# Patient Record
Sex: FEMALE | Race: WHITE | NOT HISPANIC OR LATINO | Employment: OTHER | URBAN - METROPOLITAN AREA
[De-identification: names, ages, dates, MRNs, and addresses within clinical notes are randomized per-mention and may not be internally consistent; named-entity substitution may affect disease eponyms.]

---

## 2017-01-16 ENCOUNTER — HISTORICAL (OUTPATIENT)
Dept: RESPIRATORY THERAPY | Facility: HOSPITAL | Age: 62
End: 2017-01-16

## 2017-02-06 ENCOUNTER — HISTORICAL (OUTPATIENT)
Dept: LAB | Facility: HOSPITAL | Age: 62
End: 2017-02-06

## 2017-03-14 ENCOUNTER — HISTORICAL (OUTPATIENT)
Dept: RADIOLOGY | Facility: HOSPITAL | Age: 62
End: 2017-03-14

## 2017-03-14 LAB
BUN SERPL-MCNC: 13 MG/DL (ref 7–18)
CREAT SERPL-MCNC: 0.63 MG/DL (ref 0.6–1.3)

## 2017-03-15 ENCOUNTER — HISTORICAL (OUTPATIENT)
Dept: ADMINISTRATIVE | Facility: HOSPITAL | Age: 62
End: 2017-03-15

## 2017-04-18 ENCOUNTER — HISTORICAL (OUTPATIENT)
Dept: RADIOLOGY | Facility: HOSPITAL | Age: 62
End: 2017-04-18

## 2017-05-23 ENCOUNTER — HISTORICAL (OUTPATIENT)
Dept: INTERNAL MEDICINE | Facility: CLINIC | Age: 62
End: 2017-05-23

## 2017-05-23 LAB
ABS NEUT (OLG): 2.57 X10(3)/MCL (ref 2.1–9.2)
ALBUMIN SERPL-MCNC: 4 GM/DL (ref 3.4–5)
ALBUMIN/GLOB SERPL: 1 {RATIO}
ALP SERPL-CCNC: 70 UNIT/L (ref 20–120)
ALT SERPL-CCNC: 19 UNIT/L
AST SERPL-CCNC: 23 UNIT/L
BASOPHILS # BLD AUTO: 0.04 X10(3)/MCL
BASOPHILS NFR BLD AUTO: 1 % (ref 0–1)
BILIRUB SERPL-MCNC: 0.8 MG/DL
BILIRUBIN DIRECT+TOT PNL SERPL-MCNC: <0.1 MG/DL
BILIRUBIN DIRECT+TOT PNL SERPL-MCNC: >0.7 MG/DL
BUN SERPL-MCNC: 14 MG/DL (ref 7–25)
CALCIUM SERPL-MCNC: 9.2 MG/DL (ref 8.4–10.3)
CHLORIDE SERPL-SCNC: 104 MMOL/L (ref 96–110)
CO2 SERPL-SCNC: 29 MMOL/L (ref 24–32)
CREAT SERPL-MCNC: 0.67 MG/DL (ref 0.7–1.1)
CRP SERPL-MCNC: <0.5 MG/DL
EOSINOPHIL # BLD AUTO: 0.17 10*3/UL
EOSINOPHIL NFR BLD AUTO: 3 % (ref 0–5)
ERYTHROCYTE [DISTWIDTH] IN BLOOD BY AUTOMATED COUNT: 12.8 % (ref 11.5–14.5)
ERYTHROCYTE [SEDIMENTATION RATE] IN BLOOD: 9 MM/HR (ref 0–20)
GLOBULIN SER-MCNC: 3.2 GM/ML (ref 2.3–3.5)
GLUCOSE SERPL-MCNC: 94 MG/DL (ref 65–99)
HCT VFR BLD AUTO: 39.2 % (ref 35–46)
HGB BLD-MCNC: 12.9 GM/DL (ref 12–16)
IMM GRANULOCYTES # BLD AUTO: 0.01 10*3/UL
IMM GRANULOCYTES NFR BLD AUTO: 0 %
LYMPHOCYTES # BLD AUTO: 1.83 X10(3)/MCL
LYMPHOCYTES NFR BLD AUTO: 36 % (ref 15–40)
MCH RBC QN AUTO: 29.5 PG (ref 26–34)
MCHC RBC AUTO-ENTMCNC: 32.9 GM/DL (ref 31–37)
MCV RBC AUTO: 89.7 FL (ref 80–100)
MONOCYTES # BLD AUTO: 0.52 X10(3)/MCL
MONOCYTES NFR BLD AUTO: 10 % (ref 4–12)
NEUTROPHILS # BLD AUTO: 2.57 X10(3)/MCL
NEUTROPHILS NFR BLD AUTO: 50 X10(3)/MCL
PLATELET # BLD AUTO: 214 X10(3)/MCL (ref 130–400)
PMV BLD AUTO: 8.9 FL (ref 7.4–10.4)
POTASSIUM SERPL-SCNC: 4.5 MMOL/L (ref 3.6–5.2)
PROT SERPL-MCNC: 7.2 GM/DL (ref 6–8)
RBC # BLD AUTO: 4.37 X10(6)/MCL (ref 4–5.2)
SODIUM SERPL-SCNC: 137 MMOL/L (ref 135–146)
WBC # SPEC AUTO: 5.1 X10(3)/MCL (ref 4.5–11)

## 2017-08-23 ENCOUNTER — HISTORICAL (OUTPATIENT)
Dept: INTERNAL MEDICINE | Facility: CLINIC | Age: 62
End: 2017-08-23

## 2017-08-23 LAB
ABS NEUT (OLG): 2.65 X10(3)/MCL (ref 2.1–9.2)
BASOPHILS # BLD AUTO: 0.03 X10(3)/MCL
BASOPHILS NFR BLD AUTO: 1 % (ref 0–1)
CRP SERPL-MCNC: <0.3 MG/DL
EOSINOPHIL # BLD AUTO: 0.15 X10(3)/MCL
EOSINOPHIL NFR BLD AUTO: 3 % (ref 0–5)
ERYTHROCYTE [DISTWIDTH] IN BLOOD BY AUTOMATED COUNT: 12.8 % (ref 11.5–14.5)
ERYTHROCYTE [SEDIMENTATION RATE] IN BLOOD: 7 MM/HR (ref 0–20)
HCT VFR BLD AUTO: 36 % (ref 35–46)
HGB BLD-MCNC: 11.9 GM/DL (ref 12–16)
IMM GRANULOCYTES # BLD AUTO: 0.01 10*3/UL
IMM GRANULOCYTES NFR BLD AUTO: 0 %
LYMPHOCYTES # BLD AUTO: 1.84 X10(3)/MCL
LYMPHOCYTES NFR BLD AUTO: 36 % (ref 15–40)
MCH RBC QN AUTO: 29.3 PG (ref 26–34)
MCHC RBC AUTO-ENTMCNC: 33.1 GM/DL (ref 31–37)
MCV RBC AUTO: 88.7 FL (ref 80–100)
MONOCYTES # BLD AUTO: 0.4 X10(3)/MCL
MONOCYTES NFR BLD AUTO: 8 % (ref 4–12)
NEUTROPHILS # BLD AUTO: 2.65 X10(3)/MCL
NEUTROPHILS NFR BLD AUTO: 52 X10(3)/MCL
PLATELET # BLD AUTO: 192 X10(3)/MCL (ref 130–400)
PMV BLD AUTO: 8.7 FL (ref 7.4–10.4)
RBC # BLD AUTO: 4.06 X10(6)/MCL (ref 4–5.2)
WBC # SPEC AUTO: 5.1 X10(3)/MCL (ref 4.5–11)

## 2017-10-03 ENCOUNTER — HISTORICAL (OUTPATIENT)
Dept: INTERNAL MEDICINE | Facility: CLINIC | Age: 62
End: 2017-10-03

## 2017-10-03 LAB
CRP SERPL-MCNC: 0.4 MG/DL
ERYTHROCYTE [SEDIMENTATION RATE] IN BLOOD: 12 MM/HR (ref 0–20)

## 2017-11-29 ENCOUNTER — HISTORICAL (OUTPATIENT)
Dept: ADMINISTRATIVE | Facility: HOSPITAL | Age: 62
End: 2017-11-29

## 2017-11-29 ENCOUNTER — HISTORICAL (OUTPATIENT)
Dept: RADIOLOGY | Facility: HOSPITAL | Age: 62
End: 2017-11-29

## 2017-11-29 LAB
ALBUMIN SERPL-MCNC: 4 GM/DL (ref 3.4–5)
ALBUMIN/GLOB SERPL: 1.2 RATIO (ref 1.1–2)
ALP SERPL-CCNC: 79 UNIT/L (ref 46–116)
ALT SERPL-CCNC: 18 UNIT/L (ref 12–78)
APPEARANCE, UA: CLEAR
AST SERPL-CCNC: 14 UNIT/L (ref 15–37)
BACTERIA #/AREA URNS AUTO: ABNORMAL /HPF
BILIRUB SERPL-MCNC: 0.6 MG/DL (ref 0.2–1)
BILIRUB UR QL STRIP: NEGATIVE
BILIRUBIN DIRECT+TOT PNL SERPL-MCNC: 0.15 MG/DL (ref 0–0.2)
BILIRUBIN DIRECT+TOT PNL SERPL-MCNC: 0.44 MG/DL (ref 0–0.8)
BUN SERPL-MCNC: 12.6 MG/DL (ref 7–18)
CALCIUM SERPL-MCNC: 9.3 MG/DL (ref 8.5–10.1)
CHLORIDE SERPL-SCNC: 106 MMOL/L (ref 98–107)
CHOLEST SERPL-MCNC: 234 MG/DL (ref 0–200)
CHOLEST/HDLC SERPL: 3.3 {RATIO} (ref 0–4)
CO2 SERPL-SCNC: 26.2 MMOL/L (ref 21–32)
COLOR UR: YELLOW
CREAT SERPL-MCNC: 0.82 MG/DL (ref 0.6–1.3)
CRP SERPL-MCNC: <0.3 MG/DL
ERYTHROCYTE [DISTWIDTH] IN BLOOD BY AUTOMATED COUNT: 13.5 % (ref 11.5–17)
ERYTHROCYTE [SEDIMENTATION RATE] IN BLOOD: 12 MM/HR (ref 0–20)
GLOBULIN SER-MCNC: 3.2 GM/DL (ref 2.4–3.5)
GLUCOSE (UA): NEGATIVE
GLUCOSE SERPL-MCNC: 86 MG/DL (ref 74–106)
HCT VFR BLD AUTO: 39.5 % (ref 37–47)
HDLC SERPL-MCNC: 70 MG/DL (ref 40–60)
HGB BLD-MCNC: 13.4 GM/DL (ref 12–16)
HGB UR QL STRIP: ABNORMAL
KETONES UR QL STRIP: NEGATIVE
LDLC SERPL CALC-MCNC: 149 MG/DL (ref 0–129)
LEUKOCYTE ESTERASE UR QL STRIP: ABNORMAL
MCH RBC QN AUTO: 29.7 PG (ref 27–31)
MCHC RBC AUTO-ENTMCNC: 33.8 GM/DL (ref 33–36)
MCV RBC AUTO: 88 FL (ref 80–94)
NITRITE UR QL STRIP.AUTO: NEGATIVE
PH UR STRIP: 6.5 [PH] (ref 5–9)
PLATELET # BLD AUTO: 238 X10(3)/MCL (ref 130–400)
PMV BLD AUTO: 6.6 FL (ref 7.4–10.4)
POTASSIUM SERPL-SCNC: 4 MMOL/L (ref 3.5–5.1)
PROT SERPL-MCNC: 7.2 GM/DL (ref 6.4–8.2)
PROT UR QL STRIP: NEGATIVE
RBC # BLD AUTO: 4.49 X10(6)/MCL (ref 4.2–5.4)
RBC #/AREA URNS HPF: ABNORMAL /HPF
SODIUM SERPL-SCNC: 143 MMOL/L (ref 136–145)
SP GR UR STRIP: 1.01 (ref 1–1.03)
SQUAMOUS EPITHELIAL, UA: ABNORMAL
TRIGL SERPL-MCNC: 74 MG/DL
TSH SERPL-ACNC: 0.91 MIU/ML (ref 0.36–3.74)
UROBILINOGEN UR STRIP-ACNC: 0.2
VLDLC SERPL CALC-MCNC: 15 MG/DL
WBC # SPEC AUTO: 7.3 X10(3)/MCL (ref 4.5–11.5)
WBC #/AREA URNS AUTO: ABNORMAL /HPF

## 2017-12-20 LAB — CRC RECOMMENDATION EXT: NORMAL

## 2018-01-25 ENCOUNTER — HISTORICAL (OUTPATIENT)
Dept: ADMINISTRATIVE | Facility: HOSPITAL | Age: 63
End: 2018-01-25

## 2018-01-25 LAB
CRP SERPL-MCNC: <0.3 MG/DL
ERYTHROCYTE [SEDIMENTATION RATE] IN BLOOD: 8 MM/HR (ref 0–20)

## 2018-02-12 ENCOUNTER — HISTORICAL (OUTPATIENT)
Dept: RADIOLOGY | Facility: HOSPITAL | Age: 63
End: 2018-02-12

## 2018-06-13 ENCOUNTER — HISTORICAL (OUTPATIENT)
Dept: ADMINISTRATIVE | Facility: HOSPITAL | Age: 63
End: 2018-06-13

## 2018-06-21 ENCOUNTER — HISTORICAL (OUTPATIENT)
Dept: LAB | Facility: HOSPITAL | Age: 63
End: 2018-06-21

## 2018-06-21 LAB
ALBUMIN SERPL-MCNC: 4 GM/DL (ref 3.4–5)
ALBUMIN/GLOB SERPL: 1.2 RATIO (ref 1.1–2)
ALP SERPL-CCNC: 88 UNIT/L (ref 46–116)
ALT SERPL-CCNC: 19 UNIT/L (ref 12–78)
AST SERPL-CCNC: 16 UNIT/L (ref 15–37)
BILIRUB SERPL-MCNC: 0.4 MG/DL (ref 0.2–1)
BILIRUBIN DIRECT+TOT PNL SERPL-MCNC: 0.12 MG/DL (ref 0–0.2)
BILIRUBIN DIRECT+TOT PNL SERPL-MCNC: 0.28 MG/DL (ref 0–0.8)
BUN SERPL-MCNC: 16 MG/DL (ref 7–18)
CALCIUM SERPL-MCNC: 9.1 MG/DL (ref 8.5–10.1)
CHLORIDE SERPL-SCNC: 105 MMOL/L (ref 98–107)
CHOLEST SERPL-MCNC: 247 MG/DL (ref 0–200)
CHOLEST/HDLC SERPL: 3.7 {RATIO} (ref 0–4)
CO2 SERPL-SCNC: 24.3 MMOL/L (ref 21–32)
CREAT SERPL-MCNC: 0.69 MG/DL (ref 0.6–1.3)
ERYTHROCYTE [DISTWIDTH] IN BLOOD BY AUTOMATED COUNT: 13.8 % (ref 11.5–17)
GLOBULIN SER-MCNC: 3.3 GM/DL (ref 2.4–3.5)
GLUCOSE SERPL-MCNC: 104 MG/DL (ref 74–106)
HCT VFR BLD AUTO: 39 % (ref 37–47)
HDLC SERPL-MCNC: 67 MG/DL (ref 40–60)
HGB BLD-MCNC: 13.3 GM/DL (ref 12–16)
LDLC SERPL CALC-MCNC: 157 MG/DL (ref 0–129)
MCH RBC QN AUTO: 29.8 PG (ref 27–31)
MCHC RBC AUTO-ENTMCNC: 34.1 GM/DL (ref 33–36)
MCV RBC AUTO: 87.5 FL (ref 80–94)
PLATELET # BLD AUTO: 216 X10(3)/MCL (ref 130–400)
PMV BLD AUTO: 7 FL (ref 7.4–10.4)
POTASSIUM SERPL-SCNC: 4 MMOL/L (ref 3.5–5.1)
PROT SERPL-MCNC: 7.3 GM/DL (ref 6.4–8.2)
RBC # BLD AUTO: 4.45 X10(6)/MCL (ref 4.2–5.4)
SODIUM SERPL-SCNC: 141 MMOL/L (ref 136–145)
TRIGL SERPL-MCNC: 114 MG/DL
VLDLC SERPL CALC-MCNC: 23 MG/DL
WBC # SPEC AUTO: 7 X10(3)/MCL (ref 4.5–11.5)

## 2018-07-24 ENCOUNTER — HISTORICAL (OUTPATIENT)
Dept: ADMINISTRATIVE | Facility: HOSPITAL | Age: 63
End: 2018-07-24

## 2018-07-24 LAB
ABS NEUT (OLG): 3.96 X10(3)/MCL (ref 2.1–9.2)
ALBUMIN SERPL-MCNC: 4.1 GM/DL (ref 3.4–5)
ALBUMIN/GLOB SERPL: 1 RATIO (ref 1–2)
ALP SERPL-CCNC: 88 UNIT/L (ref 45–117)
ALT SERPL-CCNC: 19 UNIT/L (ref 12–78)
AST SERPL-CCNC: 15 UNIT/L (ref 15–37)
BASOPHILS # BLD AUTO: 0.05 X10(3)/MCL
BASOPHILS NFR BLD AUTO: 1 %
BILIRUB SERPL-MCNC: 0.5 MG/DL (ref 0.2–1)
BILIRUBIN DIRECT+TOT PNL SERPL-MCNC: 0.1 MG/DL
BILIRUBIN DIRECT+TOT PNL SERPL-MCNC: 0.4 MG/DL
BUN SERPL-MCNC: 12 MG/DL (ref 7–18)
CALCIUM SERPL-MCNC: 8.6 MG/DL (ref 8.5–10.1)
CHLORIDE SERPL-SCNC: 108 MMOL/L (ref 98–107)
CO2 SERPL-SCNC: 26 MMOL/L (ref 21–32)
CREAT SERPL-MCNC: 0.7 MG/DL (ref 0.6–1.3)
CRP SERPL HS-MCNC: 3.33 MG/L (ref 0–3)
EOSINOPHIL # BLD AUTO: 0.18 X10(3)/MCL
EOSINOPHIL NFR BLD AUTO: 3 %
ERYTHROCYTE [DISTWIDTH] IN BLOOD BY AUTOMATED COUNT: 12.8 % (ref 11.5–14.5)
ERYTHROCYTE [SEDIMENTATION RATE] IN BLOOD: 13 MM/HR (ref 0–20)
GLOBULIN SER-MCNC: 3.8 GM/ML (ref 2.3–3.5)
GLUCOSE SERPL-MCNC: 84 MG/DL (ref 74–106)
HCT VFR BLD AUTO: 38.9 % (ref 35–46)
HGB BLD-MCNC: 13 GM/DL (ref 12–16)
IMM GRANULOCYTES # BLD AUTO: 0.01 10*3/UL
IMM GRANULOCYTES NFR BLD AUTO: 0 %
LYMPHOCYTES # BLD AUTO: 1.5 X10(3)/MCL
LYMPHOCYTES NFR BLD AUTO: 24 % (ref 13–40)
MCH RBC QN AUTO: 29.5 PG (ref 26–34)
MCHC RBC AUTO-ENTMCNC: 33.4 GM/DL (ref 31–37)
MCV RBC AUTO: 88.4 FL (ref 80–100)
MONOCYTES # BLD AUTO: 0.45 X10(3)/MCL
MONOCYTES NFR BLD AUTO: 7 % (ref 4–12)
NEUTROPHILS # BLD AUTO: 3.96 X10(3)/MCL
NEUTROPHILS NFR BLD AUTO: 64 X10(3)/MCL
PLATELET # BLD AUTO: 221 X10(3)/MCL (ref 130–400)
PMV BLD AUTO: 8.7 FL (ref 7.4–10.4)
POTASSIUM SERPL-SCNC: 3.8 MMOL/L (ref 3.5–5.1)
PROT SERPL-MCNC: 7.9 GM/DL (ref 6.4–8.2)
RBC # BLD AUTO: 4.4 X10(6)/MCL (ref 4–5.2)
SODIUM SERPL-SCNC: 144 MMOL/L (ref 136–145)
WBC # SPEC AUTO: 6.2 X10(3)/MCL (ref 4.5–11)

## 2018-08-08 ENCOUNTER — HISTORICAL (OUTPATIENT)
Dept: ADMINISTRATIVE | Facility: HOSPITAL | Age: 63
End: 2018-08-08

## 2018-08-28 ENCOUNTER — HISTORICAL (OUTPATIENT)
Dept: ADMINISTRATIVE | Facility: HOSPITAL | Age: 63
End: 2018-08-28

## 2018-08-28 LAB
ERYTHROCYTE [DISTWIDTH] IN BLOOD BY AUTOMATED COUNT: 12.9 % (ref 11.5–14.5)
HCT VFR BLD AUTO: 39.1 % (ref 35–46)
HGB BLD-MCNC: 12.9 GM/DL (ref 12–16)
MCH RBC QN AUTO: 29.9 PG (ref 26–34)
MCHC RBC AUTO-ENTMCNC: 33 GM/DL (ref 31–37)
MCV RBC AUTO: 90.5 FL (ref 80–100)
PLATELET # BLD AUTO: 247 X10(3)/MCL (ref 130–400)
PMV BLD AUTO: 9.4 FL (ref 7.4–10.4)
RBC # BLD AUTO: 4.32 X10(6)/MCL (ref 4–5.2)
WBC # SPEC AUTO: 6.7 X10(3)/MCL (ref 4.5–11)

## 2018-09-04 ENCOUNTER — HISTORICAL (OUTPATIENT)
Dept: SURGERY | Facility: HOSPITAL | Age: 63
End: 2018-09-04

## 2018-09-04 LAB
GRAM STN SPEC: NORMAL

## 2018-09-07 LAB
FINAL CULTURE: NO GROWTH
FINAL CULTURE: NORMAL

## 2018-09-08 LAB — FINAL CULTURE: NORMAL

## 2018-09-09 LAB
FINAL CULTURE: NORMAL

## 2018-09-18 ENCOUNTER — HISTORICAL (OUTPATIENT)
Dept: ADMINISTRATIVE | Facility: HOSPITAL | Age: 63
End: 2018-09-18

## 2018-10-02 LAB
FINAL CULTURE: NORMAL

## 2018-11-29 ENCOUNTER — HISTORICAL (OUTPATIENT)
Dept: ADMINISTRATIVE | Facility: HOSPITAL | Age: 63
End: 2018-11-29

## 2018-11-29 LAB
ABS NEUT (OLG): 2.73 X10(3)/MCL (ref 2.1–9.2)
ALBUMIN SERPL-MCNC: 4 GM/DL (ref 3.4–5)
ALBUMIN/GLOB SERPL: 1 RATIO (ref 1–2)
ALP SERPL-CCNC: 89 UNIT/L (ref 45–117)
ALT SERPL-CCNC: 23 UNIT/L (ref 12–78)
AST SERPL-CCNC: 13 UNIT/L (ref 15–37)
BASOPHILS # BLD AUTO: 0.04 X10(3)/MCL
BASOPHILS NFR BLD AUTO: 1 %
BILIRUB SERPL-MCNC: 0.5 MG/DL (ref 0.2–1)
BILIRUBIN DIRECT+TOT PNL SERPL-MCNC: 0.1 MG/DL
BILIRUBIN DIRECT+TOT PNL SERPL-MCNC: 0.4 MG/DL
BUN SERPL-MCNC: 16 MG/DL (ref 7–18)
CALCIUM SERPL-MCNC: 9.4 MG/DL (ref 8.5–10.1)
CHLORIDE SERPL-SCNC: 108 MMOL/L (ref 98–107)
CO2 SERPL-SCNC: 30 MMOL/L (ref 21–32)
CREAT SERPL-MCNC: 0.8 MG/DL (ref 0.6–1.3)
CRP SERPL-MCNC: <0.3 MG/DL
EOSINOPHIL # BLD AUTO: 0.16 10*3/UL
EOSINOPHIL NFR BLD AUTO: 3 %
ERYTHROCYTE [DISTWIDTH] IN BLOOD BY AUTOMATED COUNT: 12.9 % (ref 11.5–14.5)
ERYTHROCYTE [SEDIMENTATION RATE] IN BLOOD: 9 MM/HR (ref 0–20)
GLOBULIN SER-MCNC: 3.7 GM/ML (ref 2.3–3.5)
GLUCOSE SERPL-MCNC: 84 MG/DL (ref 74–106)
HCT VFR BLD AUTO: 39 % (ref 35–46)
HGB BLD-MCNC: 12.5 GM/DL (ref 12–16)
LYMPHOCYTES # BLD AUTO: 1.92 X10(3)/MCL
LYMPHOCYTES NFR BLD AUTO: 36 % (ref 13–40)
MCH RBC QN AUTO: 29.1 PG (ref 26–34)
MCHC RBC AUTO-ENTMCNC: 32.1 GM/DL (ref 31–37)
MCV RBC AUTO: 90.7 FL (ref 80–100)
MONOCYTES # BLD AUTO: 0.51 X10(3)/MCL
MONOCYTES NFR BLD AUTO: 10 % (ref 4–12)
NEUTROPHILS # BLD AUTO: 2.73 X10(3)/MCL
NEUTROPHILS NFR BLD AUTO: 51 X10(3)/MCL
PLATELET # BLD AUTO: 222 X10(3)/MCL (ref 130–400)
PMV BLD AUTO: 8.9 FL (ref 7.4–10.4)
POTASSIUM SERPL-SCNC: 4.2 MMOL/L (ref 3.5–5.1)
PROT SERPL-MCNC: 7.7 GM/DL (ref 6.4–8.2)
RBC # BLD AUTO: 4.3 X10(6)/MCL (ref 4–5.2)
SODIUM SERPL-SCNC: 141 MMOL/L (ref 136–145)
WBC # SPEC AUTO: 5.4 X10(3)/MCL (ref 4.5–11)

## 2019-03-07 ENCOUNTER — HISTORICAL (OUTPATIENT)
Dept: INTERNAL MEDICINE | Facility: CLINIC | Age: 64
End: 2019-03-07

## 2019-03-07 LAB
ABS NEUT (OLG): 3.17 X10(3)/MCL (ref 2.1–9.2)
ALBUMIN SERPL-MCNC: 3.5 GM/DL (ref 3.4–5)
ALBUMIN/GLOB SERPL: 1 RATIO (ref 1.1–2)
ALP SERPL-CCNC: 88 UNIT/L (ref 45–117)
ALT SERPL-CCNC: 20 UNIT/L (ref 12–78)
AST SERPL-CCNC: 16 UNIT/L (ref 15–37)
BASOPHILS # BLD AUTO: 0.04 X10(3)/MCL
BASOPHILS NFR BLD AUTO: 1 %
BILIRUB SERPL-MCNC: 0.4 MG/DL (ref 0.2–1)
BILIRUBIN DIRECT+TOT PNL SERPL-MCNC: 0.1 MG/DL
BILIRUBIN DIRECT+TOT PNL SERPL-MCNC: 0.3 MG/DL
BUN SERPL-MCNC: 12 MG/DL (ref 7–18)
CALCIUM SERPL-MCNC: 9 MG/DL (ref 8.5–10.1)
CHLORIDE SERPL-SCNC: 108 MMOL/L (ref 98–107)
CO2 SERPL-SCNC: 30 MMOL/L (ref 21–32)
CREAT SERPL-MCNC: 0.8 MG/DL (ref 0.6–1.3)
CRP SERPL-MCNC: <0.3 MG/DL
EOSINOPHIL # BLD AUTO: 0.39 X10(3)/MCL
EOSINOPHIL NFR BLD AUTO: 6 %
ERYTHROCYTE [DISTWIDTH] IN BLOOD BY AUTOMATED COUNT: 13.2 % (ref 11.5–14.5)
ERYTHROCYTE [SEDIMENTATION RATE] IN BLOOD: 11 MM/HR (ref 0–20)
GLOBULIN SER-MCNC: 3.5 GM/ML (ref 2.3–3.5)
GLUCOSE SERPL-MCNC: 83 MG/DL (ref 74–106)
HCT VFR BLD AUTO: 36.4 % (ref 35–46)
HGB BLD-MCNC: 12 GM/DL (ref 12–16)
IMM GRANULOCYTES # BLD AUTO: 0.01 10*3/UL
IMM GRANULOCYTES NFR BLD AUTO: 0 %
LYMPHOCYTES # BLD AUTO: 2.02 X10(3)/MCL
LYMPHOCYTES NFR BLD AUTO: 32 % (ref 13–40)
MCH RBC QN AUTO: 29.4 PG (ref 26–34)
MCHC RBC AUTO-ENTMCNC: 33 GM/DL (ref 31–37)
MCV RBC AUTO: 89.2 FL (ref 80–100)
MONOCYTES # BLD AUTO: 0.6 X10(3)/MCL
MONOCYTES NFR BLD AUTO: 10 % (ref 4–12)
NEUTROPHILS # BLD AUTO: 3.17 X10(3)/MCL
NEUTROPHILS NFR BLD AUTO: 51 X10(3)/MCL
PLATELET # BLD AUTO: 222 X10(3)/MCL (ref 130–400)
PMV BLD AUTO: 8.7 FL (ref 7.4–10.4)
POTASSIUM SERPL-SCNC: 4.3 MMOL/L (ref 3.5–5.1)
PROT SERPL-MCNC: 7 GM/DL (ref 6.4–8.2)
RBC # BLD AUTO: 4.08 X10(6)/MCL (ref 4–5.2)
SODIUM SERPL-SCNC: 141 MMOL/L (ref 136–145)
WBC # SPEC AUTO: 6.2 X10(3)/MCL (ref 4.5–11)

## 2019-09-09 ENCOUNTER — HISTORICAL (OUTPATIENT)
Dept: LAB | Facility: HOSPITAL | Age: 64
End: 2019-09-09

## 2019-09-09 LAB
ABS NEUT (OLG): 3.46 X10(3)/MCL (ref 2.1–9.2)
ALBUMIN SERPL-MCNC: 4 GM/DL (ref 3.4–5)
ALBUMIN/GLOB SERPL: 1.2 RATIO (ref 1.1–2)
ALP SERPL-CCNC: 83 UNIT/L (ref 46–116)
ALT SERPL-CCNC: 20 UNIT/L (ref 12–78)
AST SERPL-CCNC: 18 UNIT/L (ref 15–37)
BASOPHILS # BLD AUTO: 0 X10(3)/MCL (ref 0–0.2)
BASOPHILS NFR BLD AUTO: 1 %
BILIRUB SERPL-MCNC: 0.6 MG/DL (ref 0.2–1)
BILIRUBIN DIRECT+TOT PNL SERPL-MCNC: 0.13 MG/DL (ref 0–0.2)
BILIRUBIN DIRECT+TOT PNL SERPL-MCNC: 0.47 MG/DL (ref 0–0.8)
BUN SERPL-MCNC: 15.6 MG/DL (ref 7–18)
CALCIUM SERPL-MCNC: 9.5 MG/DL (ref 8.5–10.1)
CHLORIDE SERPL-SCNC: 107 MMOL/L (ref 98–107)
CO2 SERPL-SCNC: 27.9 MMOL/L (ref 21–32)
CREAT SERPL-MCNC: 0.84 MG/DL (ref 0.6–1.3)
CRP SERPL-MCNC: <0.2 MG/DL (ref 0–0.9)
EOSINOPHIL # BLD AUTO: 0.3 X10(3)/MCL (ref 0–0.9)
EOSINOPHIL NFR BLD AUTO: 6 %
ERYTHROCYTE [DISTWIDTH] IN BLOOD BY AUTOMATED COUNT: 13.1 % (ref 11.5–17)
ERYTHROCYTE [SEDIMENTATION RATE] IN BLOOD: 7 MM/HR (ref 0–20)
GLOBULIN SER-MCNC: 3.2 GM/DL (ref 2.4–3.5)
GLUCOSE SERPL-MCNC: 85 MG/DL (ref 74–106)
HCT VFR BLD AUTO: 40.7 % (ref 37–47)
HGB BLD-MCNC: 13.3 GM/DL (ref 12–16)
IMM GRANULOCYTES # BLD AUTO: 0.01 % (ref 0–0.02)
IMM GRANULOCYTES NFR BLD AUTO: 0.2 % (ref 0–0.43)
LYMPHOCYTES # BLD AUTO: 1.2 X10(3)/MCL (ref 0.6–4.6)
LYMPHOCYTES NFR BLD AUTO: 22 %
MCH RBC QN AUTO: 29.4 PG (ref 27–31)
MCHC RBC AUTO-ENTMCNC: 32.7 GM/DL (ref 33–36)
MCV RBC AUTO: 90 FL (ref 80–94)
MONOCYTES # BLD AUTO: 0.5 X10(3)/MCL (ref 0.1–1.3)
MONOCYTES NFR BLD AUTO: 8 %
NEUTROPHILS # BLD AUTO: 3.46 X10(3)/MCL (ref 1.4–7.9)
NEUTROPHILS NFR BLD AUTO: 63 %
PLATELET # BLD AUTO: 221 X10(3)/MCL (ref 130–400)
PMV BLD AUTO: 8.6 FL (ref 9.4–12.4)
POTASSIUM SERPL-SCNC: 4.6 MMOL/L (ref 3.5–5.1)
PROT SERPL-MCNC: 7.2 GM/DL (ref 6.4–8.2)
RBC # BLD AUTO: 4.52 X10(6)/MCL (ref 4.2–5.4)
SODIUM SERPL-SCNC: 144 MMOL/L (ref 136–145)
WBC # SPEC AUTO: 5.5 X10(3)/MCL (ref 4.5–11.5)

## 2020-03-09 ENCOUNTER — HISTORICAL (OUTPATIENT)
Dept: LAB | Facility: HOSPITAL | Age: 65
End: 2020-03-09

## 2020-03-09 LAB
ABS NEUT (OLG): 3.27 X10(3)/MCL (ref 2.1–9.2)
ALBUMIN SERPL-MCNC: 3.8 GM/DL (ref 3.4–5)
ALBUMIN/GLOB SERPL: 1.2 RATIO (ref 1.1–2)
ALP SERPL-CCNC: 92 UNIT/L (ref 46–116)
ALT SERPL-CCNC: 20 UNIT/L (ref 12–78)
AST SERPL-CCNC: 18 UNIT/L (ref 15–37)
BASOPHILS # BLD AUTO: 0 X10(3)/MCL (ref 0–0.2)
BASOPHILS NFR BLD AUTO: 1 %
BILIRUB SERPL-MCNC: 0.6 MG/DL (ref 0.2–1)
BILIRUBIN DIRECT+TOT PNL SERPL-MCNC: 0.27 MG/DL (ref 0–0.2)
BILIRUBIN DIRECT+TOT PNL SERPL-MCNC: 0.33 MG/DL (ref 0–0.8)
BUN SERPL-MCNC: 15 MG/DL (ref 7–18)
CALCIUM SERPL-MCNC: 9 MG/DL (ref 8.5–10.1)
CHLORIDE SERPL-SCNC: 106 MMOL/L (ref 98–107)
CO2 SERPL-SCNC: 25.5 MMOL/L (ref 21–32)
CREAT SERPL-MCNC: 0.82 MG/DL (ref 0.6–1.3)
CRP SERPL-MCNC: 0.5 MG/DL (ref 0–0.9)
EOSINOPHIL # BLD AUTO: 0.2 X10(3)/MCL (ref 0–0.9)
EOSINOPHIL NFR BLD AUTO: 4 %
ERYTHROCYTE [DISTWIDTH] IN BLOOD BY AUTOMATED COUNT: 13 % (ref 11.5–17)
ERYTHROCYTE [SEDIMENTATION RATE] IN BLOOD: 16 MM/HR (ref 0–20)
GLOBULIN SER-MCNC: 3.1 GM/DL (ref 2.4–3.5)
GLUCOSE SERPL-MCNC: 94 MG/DL (ref 74–106)
HCT VFR BLD AUTO: 38.2 % (ref 37–47)
HGB BLD-MCNC: 12.6 GM/DL (ref 12–16)
IMM GRANULOCYTES # BLD AUTO: 0.01 % (ref 0–0.02)
IMM GRANULOCYTES NFR BLD AUTO: 0.2 % (ref 0–0.43)
LYMPHOCYTES # BLD AUTO: 1.2 X10(3)/MCL (ref 0.6–4.6)
LYMPHOCYTES NFR BLD AUTO: 23 %
MCH RBC QN AUTO: 29.3 PG (ref 27–31)
MCHC RBC AUTO-ENTMCNC: 33 GM/DL (ref 33–36)
MCV RBC AUTO: 88.8 FL (ref 80–94)
MONOCYTES # BLD AUTO: 0.4 X10(3)/MCL (ref 0.1–1.3)
MONOCYTES NFR BLD AUTO: 9 %
NEUTROPHILS # BLD AUTO: 3.27 X10(3)/MCL (ref 1.4–7.9)
NEUTROPHILS NFR BLD AUTO: 64 %
PLATELET # BLD AUTO: 229 X10(3)/MCL (ref 130–400)
PMV BLD AUTO: 8.8 FL (ref 9.4–12.4)
POTASSIUM SERPL-SCNC: 4.3 MMOL/L (ref 3.5–5.1)
PROT SERPL-MCNC: 6.9 GM/DL (ref 6.4–8.2)
RBC # BLD AUTO: 4.3 X10(6)/MCL (ref 4.2–5.4)
SODIUM SERPL-SCNC: 141 MMOL/L (ref 136–145)
WBC # SPEC AUTO: 5.1 X10(3)/MCL (ref 4.5–11.5)

## 2020-09-04 ENCOUNTER — HISTORICAL (OUTPATIENT)
Dept: LAB | Facility: HOSPITAL | Age: 65
End: 2020-09-04

## 2020-09-04 LAB
ABS NEUT (OLG): 2.72 X10(3)/MCL (ref 2.1–9.2)
ALBUMIN SERPL-MCNC: 3.9 GM/DL (ref 3.4–5)
ALBUMIN/GLOB SERPL: 1.2 RATIO (ref 1.1–2)
ALP SERPL-CCNC: 76 UNIT/L (ref 46–116)
ALT SERPL-CCNC: 19 UNIT/L (ref 12–78)
AST SERPL-CCNC: 16 UNIT/L (ref 15–37)
BASOPHILS # BLD AUTO: 0 X10(3)/MCL (ref 0–0.2)
BASOPHILS NFR BLD AUTO: 1 %
BILIRUB SERPL-MCNC: 0.6 MG/DL (ref 0.2–1)
BILIRUBIN DIRECT+TOT PNL SERPL-MCNC: 0.17 MG/DL (ref 0–0.2)
BILIRUBIN DIRECT+TOT PNL SERPL-MCNC: 0.43 MG/DL (ref 0–0.8)
BUN SERPL-MCNC: 16.6 MG/DL (ref 7–18)
CALCIUM SERPL-MCNC: 9.4 MG/DL (ref 8.5–10.1)
CHLORIDE SERPL-SCNC: 107 MMOL/L (ref 98–107)
CO2 SERPL-SCNC: 29.3 MMOL/L (ref 21–32)
CREAT SERPL-MCNC: 0.84 MG/DL (ref 0.6–1.3)
CRP SERPL-MCNC: <0.2 MG/DL (ref 0–0.9)
EOSINOPHIL # BLD AUTO: 0.2 X10(3)/MCL (ref 0–0.9)
EOSINOPHIL NFR BLD AUTO: 3 %
ERYTHROCYTE [DISTWIDTH] IN BLOOD BY AUTOMATED COUNT: 12.9 % (ref 11.5–17)
ERYTHROCYTE [SEDIMENTATION RATE] IN BLOOD: 15 MM/HR (ref 0–20)
GLOBULIN SER-MCNC: 3.2 GM/DL (ref 2.4–3.5)
GLUCOSE SERPL-MCNC: 96 MG/DL (ref 74–106)
HCT VFR BLD AUTO: 37.8 % (ref 37–47)
HGB BLD-MCNC: 12.8 GM/DL (ref 12–16)
LYMPHOCYTES # BLD AUTO: 1.2 X10(3)/MCL (ref 0.6–4.6)
LYMPHOCYTES NFR BLD AUTO: 27 %
MCH RBC QN AUTO: 30.2 PG (ref 27–31)
MCHC RBC AUTO-ENTMCNC: 33.9 GM/DL (ref 33–36)
MCV RBC AUTO: 89.2 FL (ref 80–94)
MONOCYTES # BLD AUTO: 0.4 X10(3)/MCL (ref 0.1–1.3)
MONOCYTES NFR BLD AUTO: 9 %
NEUTROPHILS # BLD AUTO: 2.72 X10(3)/MCL (ref 1.4–7.9)
NEUTROPHILS NFR BLD AUTO: 60 %
PLATELET # BLD AUTO: 219 X10(3)/MCL (ref 130–400)
PMV BLD AUTO: 8.8 FL (ref 9.4–12.4)
POTASSIUM SERPL-SCNC: 4.7 MMOL/L (ref 3.5–5.1)
PROT SERPL-MCNC: 7.1 GM/DL (ref 6.4–8.2)
RBC # BLD AUTO: 4.24 X10(6)/MCL (ref 4.2–5.4)
SODIUM SERPL-SCNC: 142 MMOL/L (ref 136–145)
WBC # SPEC AUTO: 4.6 X10(3)/MCL (ref 4.5–11.5)

## 2021-03-04 ENCOUNTER — HISTORICAL (OUTPATIENT)
Dept: LAB | Facility: HOSPITAL | Age: 66
End: 2021-03-04

## 2021-03-04 LAB
ABS NEUT (OLG): 3.44 X10(3)/MCL (ref 2.1–9.2)
ALBUMIN SERPL-MCNC: 4 GM/DL (ref 3.4–4.8)
ALBUMIN/GLOB SERPL: 1.4 RATIO (ref 1.1–2)
ALP SERPL-CCNC: 86 UNIT/L (ref 40–150)
ALT SERPL-CCNC: 12 UNIT/L (ref 0–55)
APPEARANCE, UA: CLEAR
AST SERPL-CCNC: 15 UNIT/L (ref 5–34)
BACTERIA SPEC CULT: NORMAL
BASOPHILS # BLD AUTO: 0.1 X10(3)/MCL (ref 0–0.2)
BASOPHILS NFR BLD AUTO: 1 %
BILIRUB SERPL-MCNC: 0.7 MG/DL
BILIRUB UR QL STRIP: NEGATIVE
BILIRUBIN DIRECT+TOT PNL SERPL-MCNC: 0.2 MG/DL (ref 0–0.5)
BILIRUBIN DIRECT+TOT PNL SERPL-MCNC: 0.5 MG/DL (ref 0–0.8)
BUN SERPL-MCNC: 12.4 MG/DL (ref 9.8–20.1)
CALCIUM SERPL-MCNC: 9.4 MG/DL (ref 8.4–10.2)
CHLORIDE SERPL-SCNC: 106 MMOL/L (ref 98–107)
CHOLEST SERPL-MCNC: 210 MG/DL
CHOLEST/HDLC SERPL: 4 {RATIO} (ref 0–5)
CO2 SERPL-SCNC: 27 MMOL/L (ref 23–31)
COLOR UR: YELLOW
CREAT SERPL-MCNC: 0.74 MG/DL (ref 0.55–1.02)
CRP SERPL-MCNC: 0 MG/DL (ref 0–1)
EOSINOPHIL # BLD AUTO: 0.3 X10(3)/MCL (ref 0–0.9)
EOSINOPHIL NFR BLD AUTO: 5 %
ERYTHROCYTE [DISTWIDTH] IN BLOOD BY AUTOMATED COUNT: 12.7 % (ref 11.5–17)
ERYTHROCYTE [SEDIMENTATION RATE] IN BLOOD: 12 MM/HR (ref 0–20)
GLOBULIN SER-MCNC: 2.9 GM/DL (ref 2.4–3.5)
GLUCOSE (UA): 100
GLUCOSE SERPL-MCNC: 89 MG/DL (ref 82–115)
HCT VFR BLD AUTO: 42.1 % (ref 37–47)
HDLC SERPL-MCNC: 54 MG/DL (ref 35–60)
HGB BLD-MCNC: 13.6 GM/DL (ref 12–16)
HGB UR QL STRIP: NEGATIVE
IMM GRANULOCYTES # BLD AUTO: 0.01 % (ref 0–0.02)
IMM GRANULOCYTES NFR BLD AUTO: 0.2 % (ref 0–0.43)
KETONES UR QL STRIP: NEGATIVE
LDLC SERPL CALC-MCNC: 130 MG/DL (ref 50–140)
LEUKOCYTE ESTERASE UR QL STRIP: NEGATIVE
LYMPHOCYTES # BLD AUTO: 1.5 X10(3)/MCL (ref 0.6–4.6)
LYMPHOCYTES NFR BLD AUTO: 26 %
MCH RBC QN AUTO: 30.4 PG (ref 27–31)
MCHC RBC AUTO-ENTMCNC: 32.3 GM/DL (ref 33–36)
MCV RBC AUTO: 94 FL (ref 80–94)
MONOCYTES # BLD AUTO: 0.4 X10(3)/MCL (ref 0.1–1.3)
MONOCYTES NFR BLD AUTO: 8 %
NEUTROPHILS # BLD AUTO: 3.44 X10(3)/MCL (ref 1.4–7.9)
NEUTROPHILS NFR BLD AUTO: 60 %
NITRITE UR QL STRIP: NEGATIVE
PH UR STRIP: 5.5 [PH] (ref 5–9)
PLATELET # BLD AUTO: 247 X10(3)/MCL (ref 130–400)
PMV BLD AUTO: 9 FL (ref 9.4–12.4)
POTASSIUM SERPL-SCNC: 4.7 MMOL/L (ref 3.5–5.1)
PROT SERPL-MCNC: 6.9 GM/DL (ref 5.8–7.6)
PROT UR QL STRIP: NEGATIVE
RBC # BLD AUTO: 4.48 X10(6)/MCL (ref 4.2–5.4)
RBC #/AREA URNS HPF: NORMAL /[HPF]
SODIUM SERPL-SCNC: 143 MMOL/L (ref 136–145)
SP GR UR STRIP: 1.01 (ref 1–1.03)
SQUAMOUS EPITHELIAL, UA: NORMAL
TRIGL SERPL-MCNC: 128 MG/DL (ref 37–140)
TSH SERPL-ACNC: 0.88 UIU/ML (ref 0.35–4.94)
UROBILINOGEN UR STRIP-ACNC: 0.2
VLDLC SERPL CALC-MCNC: 26 MG/DL
WBC # SPEC AUTO: 5.7 X10(3)/MCL (ref 4.5–11.5)
WBC #/AREA URNS HPF: NORMAL /[HPF]

## 2021-03-05 ENCOUNTER — HISTORICAL (OUTPATIENT)
Dept: RADIOLOGY | Facility: HOSPITAL | Age: 66
End: 2021-03-05

## 2021-03-22 ENCOUNTER — HISTORICAL (OUTPATIENT)
Dept: LAB | Facility: HOSPITAL | Age: 66
End: 2021-03-22

## 2021-03-22 LAB
ALBUMIN SERPL-MCNC: 3.8 GM/DL (ref 3.4–4.8)
ALP SERPL-CCNC: 88 UNIT/L (ref 40–150)
ALT SERPL-CCNC: 13 UNIT/L (ref 0–55)
AST SERPL-CCNC: 18 UNIT/L (ref 5–34)
BILIRUB SERPL-MCNC: 0.5 MG/DL
BILIRUBIN DIRECT+TOT PNL SERPL-MCNC: 0.2 MG/DL (ref 0–0.5)
BILIRUBIN DIRECT+TOT PNL SERPL-MCNC: 0.3 MG/DL (ref 0–0.8)
CHOLEST SERPL-MCNC: 204 MG/DL
CHOLEST/HDLC SERPL: 4 {RATIO} (ref 0–5)
HDLC SERPL-MCNC: 51 MG/DL (ref 35–60)
LDLC SERPL CALC-MCNC: 134 MG/DL (ref 50–140)
PROT SERPL-MCNC: 6.7 GM/DL (ref 5.8–7.6)
TRIGL SERPL-MCNC: 96 MG/DL (ref 37–140)
VLDLC SERPL CALC-MCNC: 19 MG/DL

## 2021-09-10 ENCOUNTER — HISTORICAL (OUTPATIENT)
Dept: ADMINISTRATIVE | Facility: HOSPITAL | Age: 66
End: 2021-09-10

## 2021-09-10 LAB
ABS NEUT (OLG): 7.81 X10(3)/MCL (ref 2.1–9.2)
ALBUMIN SERPL-MCNC: 4 GM/DL (ref 3.4–4.8)
ALBUMIN/GLOB SERPL: 1.1 RATIO (ref 1.1–2)
ALP SERPL-CCNC: 83 UNIT/L (ref 40–150)
ALT SERPL-CCNC: 19 UNIT/L (ref 0–55)
AST SERPL-CCNC: 18 UNIT/L (ref 5–34)
BASOPHILS # BLD AUTO: 0 X10(3)/MCL (ref 0–0.2)
BASOPHILS NFR BLD AUTO: 0 %
BILIRUB SERPL-MCNC: 0.5 MG/DL
BILIRUBIN DIRECT+TOT PNL SERPL-MCNC: 0.2 MG/DL (ref 0–0.5)
BILIRUBIN DIRECT+TOT PNL SERPL-MCNC: 0.3 MG/DL (ref 0–0.8)
BUN SERPL-MCNC: 19.7 MG/DL (ref 9.8–20.1)
CALCIUM SERPL-MCNC: 9.8 MG/DL (ref 8.4–10.2)
CHLORIDE SERPL-SCNC: 106 MMOL/L (ref 98–107)
CO2 SERPL-SCNC: 23 MMOL/L (ref 23–31)
CREAT SERPL-MCNC: 0.77 MG/DL (ref 0.55–1.02)
CRP SERPL-MCNC: 0.47 MG/DL
EOSINOPHIL # BLD AUTO: 0.1 X10(3)/MCL (ref 0–0.9)
EOSINOPHIL NFR BLD AUTO: 1 %
ERYTHROCYTE [DISTWIDTH] IN BLOOD BY AUTOMATED COUNT: 13.2 % (ref 11.5–14.5)
ERYTHROCYTE [SEDIMENTATION RATE] IN BLOOD: 12 MM/HR (ref 0–20)
GLOBULIN SER-MCNC: 3.5 GM/DL (ref 2.4–3.5)
GLUCOSE SERPL-MCNC: 89 MG/DL (ref 82–115)
HCT VFR BLD AUTO: 38.6 % (ref 35–46)
HGB BLD-MCNC: 12.8 GM/DL (ref 12–16)
IMM GRANULOCYTES # BLD AUTO: 0.03 10*3/UL
IMM GRANULOCYTES NFR BLD AUTO: 0 %
LYMPHOCYTES # BLD AUTO: 1.2 X10(3)/MCL (ref 0.6–4.6)
LYMPHOCYTES NFR BLD AUTO: 12 %
MCH RBC QN AUTO: 29.2 PG (ref 26–34)
MCHC RBC AUTO-ENTMCNC: 33.2 GM/DL (ref 31–37)
MCV RBC AUTO: 88.1 FL (ref 80–100)
MONOCYTES # BLD AUTO: 0.6 X10(3)/MCL (ref 0.1–1.3)
MONOCYTES NFR BLD AUTO: 7 %
NEUTROPHILS # BLD AUTO: 7.81 X10(3)/MCL (ref 2.1–9.2)
NEUTROPHILS NFR BLD AUTO: 80 %
NRBC BLD AUTO-RTO: 0 % (ref 0–0.2)
PLATELET # BLD AUTO: 275 X10(3)/MCL (ref 130–400)
PMV BLD AUTO: 8.7 FL (ref 7.4–10.4)
POTASSIUM SERPL-SCNC: 3.9 MMOL/L (ref 3.5–5.1)
PROT SERPL-MCNC: 7.5 GM/DL (ref 5.8–7.6)
RBC # BLD AUTO: 4.38 X10(6)/MCL (ref 4–5.2)
SODIUM SERPL-SCNC: 139 MMOL/L (ref 136–145)
WBC # SPEC AUTO: 9.7 X10(3)/MCL (ref 4.5–11)

## 2021-12-16 LAB — BCS RECOMMENDATION EXT: NORMAL

## 2022-01-24 ENCOUNTER — HISTORICAL (OUTPATIENT)
Dept: LAB | Facility: HOSPITAL | Age: 67
End: 2022-01-24

## 2022-01-24 LAB
ABS NEUT (OLG): 3.67 X10(3)/MCL (ref 2.1–9.2)
ALBUMIN SERPL-MCNC: 3.9 GM/DL (ref 3.4–4.8)
ALBUMIN/GLOB SERPL: 1.2 RATIO (ref 1.1–2)
ALP SERPL-CCNC: 82 UNIT/L (ref 40–150)
ALT SERPL-CCNC: 12 UNIT/L (ref 0–55)
AST SERPL-CCNC: 15 UNIT/L (ref 5–34)
BASOPHILS # BLD AUTO: 0 X10(3)/MCL (ref 0–0.2)
BASOPHILS NFR BLD AUTO: 1 %
BILIRUB SERPL-MCNC: 0.6 MG/DL
BILIRUBIN DIRECT+TOT PNL SERPL-MCNC: 0.2 MG/DL (ref 0–0.5)
BILIRUBIN DIRECT+TOT PNL SERPL-MCNC: 0.4 MG/DL (ref 0–0.8)
BUN SERPL-MCNC: 15.7 MG/DL (ref 9.8–20.1)
CALCIUM SERPL-MCNC: 9.8 MG/DL (ref 8.7–10.5)
CHLORIDE SERPL-SCNC: 106 MMOL/L (ref 98–107)
CO2 SERPL-SCNC: 26 MMOL/L (ref 23–31)
CREAT SERPL-MCNC: 0.73 MG/DL (ref 0.55–1.02)
CRP SERPL-MCNC: <0.4 MG/DL
EOSINOPHIL # BLD AUTO: 0.1 X10(3)/MCL (ref 0–0.9)
EOSINOPHIL NFR BLD AUTO: 2 %
ERYTHROCYTE [DISTWIDTH] IN BLOOD BY AUTOMATED COUNT: 13.2 % (ref 11.5–14.5)
ERYTHROCYTE [SEDIMENTATION RATE] IN BLOOD: 10 MM/HR (ref 0–20)
GLOBULIN SER-MCNC: 3.2 GM/DL (ref 2.4–3.5)
GLUCOSE SERPL-MCNC: 90 MG/DL (ref 82–115)
HAV AB SER QL IA: NONREACTIVE
HBV SURFACE AB SER-ACNC: 0 MIU/ML
HBV SURFACE AB SERPL IA-ACNC: NONREACTIVE M[IU]/ML
HCT VFR BLD AUTO: 39.3 % (ref 35–46)
HGB BLD-MCNC: 12.8 GM/DL (ref 12–16)
IMM GRANULOCYTES # BLD AUTO: 0.01 10*3/UL
IMM GRANULOCYTES NFR BLD AUTO: 0 %
LYMPHOCYTES # BLD AUTO: 1.4 X10(3)/MCL (ref 0.6–4.6)
LYMPHOCYTES NFR BLD AUTO: 24 %
MCH RBC QN AUTO: 29.2 PG (ref 26–34)
MCHC RBC AUTO-ENTMCNC: 32.6 GM/DL (ref 31–37)
MCV RBC AUTO: 89.5 FL (ref 80–100)
MONOCYTES # BLD AUTO: 0.5 X10(3)/MCL (ref 0.1–1.3)
MONOCYTES NFR BLD AUTO: 8 %
NEUTROPHILS # BLD AUTO: 3.67 X10(3)/MCL (ref 2.1–9.2)
NEUTROPHILS NFR BLD AUTO: 64 %
NRBC BLD AUTO-RTO: 0 % (ref 0–0.2)
PLATELET # BLD AUTO: 196 X10(3)/MCL (ref 130–400)
PMV BLD AUTO: 8.6 FL (ref 7.4–10.4)
POTASSIUM SERPL-SCNC: 4.2 MMOL/L (ref 3.5–5.1)
PROT SERPL-MCNC: 7.1 GM/DL (ref 5.8–7.6)
RBC # BLD AUTO: 4.39 X10(6)/MCL (ref 4–5.2)
SODIUM SERPL-SCNC: 140 MMOL/L (ref 136–145)
WBC # SPEC AUTO: 5.8 X10(3)/MCL (ref 4.5–11)

## 2022-04-10 ENCOUNTER — HISTORICAL (OUTPATIENT)
Dept: ADMINISTRATIVE | Facility: HOSPITAL | Age: 67
End: 2022-04-10
Payer: MEDICARE

## 2022-04-29 VITALS
HEIGHT: 65 IN | SYSTOLIC BLOOD PRESSURE: 114 MMHG | BODY MASS INDEX: 21.31 KG/M2 | DIASTOLIC BLOOD PRESSURE: 75 MMHG | WEIGHT: 127.88 LBS | OXYGEN SATURATION: 95 %
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
## 2022-07-21 ENCOUNTER — LAB VISIT (OUTPATIENT)
Dept: LAB | Facility: HOSPITAL | Age: 67
End: 2022-07-21
Attending: NURSE PRACTITIONER
Payer: MEDICARE

## 2022-07-21 DIAGNOSIS — L40.3 SAPHO SYNDROME: ICD-10-CM

## 2022-07-21 DIAGNOSIS — M85.80 SAPHO SYNDROME: ICD-10-CM

## 2022-07-21 DIAGNOSIS — M86.9 SAPHO SYNDROME: ICD-10-CM

## 2022-07-21 DIAGNOSIS — L70.9 SAPHO SYNDROME: ICD-10-CM

## 2022-07-21 DIAGNOSIS — M65.9 SAPHO SYNDROME: ICD-10-CM

## 2022-07-21 DIAGNOSIS — Z00.00 ROUTINE GENERAL MEDICAL EXAMINATION AT A HEALTH CARE FACILITY: Primary | ICD-10-CM

## 2022-07-21 DIAGNOSIS — E78.5 HYPERLIPIDEMIA, UNSPECIFIED HYPERLIPIDEMIA TYPE: ICD-10-CM

## 2022-07-21 DIAGNOSIS — J44.89 OBSTRUCTIVE CHRONIC BRONCHITIS WITHOUT EXACERBATION: ICD-10-CM

## 2022-07-21 LAB
ALBUMIN SERPL-MCNC: 3.6 GM/DL (ref 3.4–4.8)
ALBUMIN/GLOB SERPL: 1.2 RATIO (ref 1.1–2)
ALP SERPL-CCNC: 73 UNIT/L (ref 40–150)
ALT SERPL-CCNC: 15 UNIT/L (ref 0–55)
APPEARANCE UR: CLEAR
AST SERPL-CCNC: 15 UNIT/L (ref 5–34)
BACTERIA #/AREA URNS AUTO: NORMAL /HPF
BASOPHILS # BLD AUTO: 0.03 X10(3)/MCL (ref 0–0.2)
BASOPHILS NFR BLD AUTO: 0.5 %
BILIRUB UR QL STRIP.AUTO: NEGATIVE MG/DL
BILIRUBIN DIRECT+TOT PNL SERPL-MCNC: 0.7 MG/DL
BUN SERPL-MCNC: 14.1 MG/DL (ref 9.8–20.1)
CALCIUM SERPL-MCNC: 9.4 MG/DL (ref 8.4–10.2)
CHLORIDE SERPL-SCNC: 107 MMOL/L (ref 98–107)
CHOLEST SERPL-MCNC: 132 MG/DL
CHOLEST/HDLC SERPL: 2 {RATIO} (ref 0–5)
CO2 SERPL-SCNC: 22 MMOL/L (ref 23–31)
COLOR UR AUTO: YELLOW
CREAT SERPL-MCNC: 0.78 MG/DL (ref 0.55–1.02)
CRP SERPL-MCNC: 0.1 MG/L
EOSINOPHIL # BLD AUTO: 0.11 X10(3)/MCL (ref 0–0.9)
EOSINOPHIL NFR BLD AUTO: 2 %
ERYTHROCYTE [DISTWIDTH] IN BLOOD BY AUTOMATED COUNT: 13.5 % (ref 11.5–17)
ERYTHROCYTE [SEDIMENTATION RATE] IN BLOOD: 17 MM/HR (ref 0–20)
GLOBULIN SER-MCNC: 3.1 GM/DL (ref 2.4–3.5)
GLUCOSE SERPL-MCNC: 107 MG/DL (ref 82–115)
GLUCOSE UR QL STRIP.AUTO: NEGATIVE MG/DL
HBV SURFACE AG SERPL QL IA: NONREACTIVE
HCT VFR BLD AUTO: 35.8 % (ref 37–47)
HCV AB SERPL QL IA: NONREACTIVE
HDLC SERPL-MCNC: 55 MG/DL (ref 35–60)
HGB BLD-MCNC: 11.8 GM/DL (ref 12–16)
IMM GRANULOCYTES # BLD AUTO: 0 X10(3)/MCL (ref 0–0.04)
IMM GRANULOCYTES NFR BLD AUTO: 0 %
KETONES UR QL STRIP.AUTO: NEGATIVE MG/DL
LDLC SERPL CALC-MCNC: 54 MG/DL (ref 50–140)
LEUKOCYTE ESTERASE UR QL STRIP.AUTO: ABNORMAL UNIT/L
LYMPHOCYTES # BLD AUTO: 1.43 X10(3)/MCL (ref 0.6–4.6)
LYMPHOCYTES NFR BLD AUTO: 25.7 %
MCH RBC QN AUTO: 29 PG (ref 27–31)
MCHC RBC AUTO-ENTMCNC: 33 MG/DL (ref 33–36)
MCV RBC AUTO: 88 FL (ref 80–94)
MONOCYTES # BLD AUTO: 0.44 X10(3)/MCL (ref 0.1–1.3)
MONOCYTES NFR BLD AUTO: 7.9 %
NEUTROPHILS # BLD AUTO: 3.6 X10(3)/MCL (ref 2.1–9.2)
NEUTROPHILS NFR BLD AUTO: 63.9 %
NITRITE UR QL STRIP.AUTO: NEGATIVE
PH UR STRIP.AUTO: 6 [PH]
PLATELET # BLD AUTO: 209 X10(3)/MCL (ref 130–400)
PMV BLD AUTO: 8.7 FL (ref 7.4–10.4)
POTASSIUM SERPL-SCNC: 3.9 MMOL/L (ref 3.5–5.1)
PROT SERPL-MCNC: 6.7 GM/DL (ref 5.8–7.6)
PROT UR QL STRIP.AUTO: NEGATIVE MG/DL
RBC # BLD AUTO: 4.07 X10(6)/MCL (ref 4.2–5.4)
RBC #/AREA URNS AUTO: NORMAL /HPF
RBC UR QL AUTO: NEGATIVE UNIT/L
SODIUM SERPL-SCNC: 141 MMOL/L (ref 136–145)
SP GR UR STRIP.AUTO: <=1.005
SQUAMOUS #/AREA URNS AUTO: NORMAL /HPF
TRIGL SERPL-MCNC: 113 MG/DL (ref 37–140)
TSH SERPL-ACNC: 0.91 UIU/ML (ref 0.35–4.94)
UROBILINOGEN UR STRIP-ACNC: 0.2 MG/DL
VLDLC SERPL CALC-MCNC: 23 MG/DL
WBC # SPEC AUTO: 5.6 X10(3)/MCL (ref 4.5–11.5)
WBC #/AREA URNS AUTO: NORMAL /HPF

## 2022-07-21 PROCEDURE — 84443 ASSAY THYROID STIM HORMONE: CPT

## 2022-07-21 PROCEDURE — 85651 RBC SED RATE NONAUTOMATED: CPT

## 2022-07-21 PROCEDURE — 80053 COMPREHEN METABOLIC PANEL: CPT

## 2022-07-21 PROCEDURE — 85025 COMPLETE CBC W/AUTO DIFF WBC: CPT

## 2022-07-21 PROCEDURE — 81001 URINALYSIS AUTO W/SCOPE: CPT

## 2022-07-21 PROCEDURE — 36415 COLL VENOUS BLD VENIPUNCTURE: CPT

## 2022-07-21 PROCEDURE — 80061 LIPID PANEL: CPT

## 2022-07-21 PROCEDURE — 86140 C-REACTIVE PROTEIN: CPT

## 2022-07-21 PROCEDURE — 87340 HEPATITIS B SURFACE AG IA: CPT

## 2022-07-21 PROCEDURE — 86803 HEPATITIS C AB TEST: CPT

## 2022-07-22 RX ORDER — ROSUVASTATIN CALCIUM 5 MG/1
TABLET, COATED ORAL
COMMUNITY
Start: 2022-07-11 | End: 2022-12-15

## 2022-07-22 RX ORDER — ROSUVASTATIN CALCIUM 10 MG/1
TABLET, COATED ORAL
COMMUNITY
Start: 2022-04-04 | End: 2022-07-25

## 2022-07-22 RX ORDER — DOXYCYCLINE 100 MG/1
CAPSULE ORAL
COMMUNITY
Start: 2022-07-18 | End: 2022-07-25 | Stop reason: SDUPTHER

## 2022-07-22 RX ORDER — TIOTROPIUM BROMIDE INHALATION SPRAY 1.56 UG/1
SPRAY, METERED RESPIRATORY (INHALATION)
COMMUNITY
Start: 2021-09-13

## 2022-07-22 RX ORDER — BUDESONIDE AND FORMOTEROL FUMARATE DIHYDRATE 160; 4.5 UG/1; UG/1
AEROSOL RESPIRATORY (INHALATION)
COMMUNITY
Start: 2022-01-25 | End: 2024-03-13

## 2022-07-22 RX ORDER — NAPROXEN SODIUM 220 MG
220 TABLET ORAL
COMMUNITY
End: 2022-07-25

## 2022-07-22 RX ORDER — HYDROCODONE BITARTRATE AND ACETAMINOPHEN 5; 325 MG/1; MG/1
1 TABLET ORAL EVERY 6 HOURS PRN
COMMUNITY
End: 2022-07-25

## 2022-07-22 RX ORDER — ACETAMINOPHEN 500 MG
500 TABLET ORAL EVERY 6 HOURS PRN
COMMUNITY

## 2022-07-25 ENCOUNTER — OFFICE VISIT (OUTPATIENT)
Dept: INFECTIOUS DISEASES | Facility: CLINIC | Age: 67
End: 2022-07-25
Payer: MEDICARE

## 2022-07-25 VITALS
SYSTOLIC BLOOD PRESSURE: 94 MMHG | RESPIRATION RATE: 14 BRPM | TEMPERATURE: 98 F | DIASTOLIC BLOOD PRESSURE: 57 MMHG | HEART RATE: 102 BPM | BODY MASS INDEX: 20.76 KG/M2 | WEIGHT: 121.63 LBS | HEIGHT: 64 IN

## 2022-07-25 DIAGNOSIS — M86.632: Primary | ICD-10-CM

## 2022-07-25 DIAGNOSIS — Z79.2 CHRONIC ANTIBIOTIC SUPPRESSION: ICD-10-CM

## 2022-07-25 PROCEDURE — 90471 IMMUNIZATION ADMIN: CPT | Mod: PBBFAC

## 2022-07-25 PROCEDURE — 99214 OFFICE O/P EST MOD 30 MIN: CPT | Mod: S$PBB,,, | Performed by: NURSE PRACTITIONER

## 2022-07-25 PROCEDURE — 99213 OFFICE O/P EST LOW 20 MIN: CPT | Mod: PBBFAC | Performed by: NURSE PRACTITIONER

## 2022-07-25 PROCEDURE — 90636 HEP A/HEP B VACC ADULT IM: CPT | Mod: PBBFAC

## 2022-07-25 PROCEDURE — 99214 PR OFFICE/OUTPT VISIT, EST, LEVL IV, 30-39 MIN: ICD-10-PCS | Mod: S$PBB,,, | Performed by: NURSE PRACTITIONER

## 2022-07-25 RX ORDER — DOXYCYCLINE 100 MG/1
100 CAPSULE ORAL EVERY 12 HOURS
Qty: 60 CAPSULE | Refills: 4 | Status: SHIPPED | OUTPATIENT
Start: 2022-07-25 | End: 2022-12-15 | Stop reason: SDUPTHER

## 2022-07-25 RX ADMIN — HEPATITIS A AND HEPATITIS B (RECOMBINANT) VACCINE 720 UNITS: 720; 20 INJECTION, SUSPENSION INTRAMUSCULAR at 08:07

## 2022-07-25 NOTE — PROGRESS NOTES
Subjective:       Patient ID: Maryellen Velez is a 67 y.o. female.    Chief Complaint: Follow-up (Osteo)    Ms. Velez is a 66 y/o WF here for evaluation of left arm osteomyelitis. Hx of past ulnar fx 2016 with MRSA positive culture 9/2016 and bone bx culture + for MRSA 9/4/18. Pt completed 6 weeks of Daptomycin 6mg/kg IV daily 10/26/18.  Then she was started on Doxycycline 100mg 1 po BID lifelong. Pt is tolerating the meds well without any complaints. Denies fever, chills, headaches, visual problems, N/V/D, SOB, cough, abdominal pain, drainage/erythema/warmth from the healed surgical site. States a few weeks ago she was having some left wrist pain with swelling. She was seen in the Urgent Care and told nothing was abnormal. Swelling and pain have since subsided. Reviewed labs from 7/21/22 CBC/CMP WNL, sed rate 17, CRP 0.10, Hep A IGG NR, Hep B s ab NR, Hep B surface ag neg, Hep C neg. Pt tells me she is tolerating the Doxycycline well with no problems.  Pt is due for multiple vaccines. However, she is only interested Twinrix. Pt is amenable to to starting the Twinrix series today. PCP is Dr. Elias in [place]. Cervical pap was done by Dr. Elias. Colonoscopy 2017 in [place] by Dr. Childs.     I spent a total of 37 minutes on the day of the visit.This includes face to face time and non-face to face time preparing to see the patient (eg, review of tests), obtaining and/or reviewing separately obtained history, documenting clinical information in the electronic or other health record, independently interpreting results and communicating results to the patient/family/caregiver, or care coordinator.      Review of Systems   Constitutional: Negative.    HENT: Negative.    Eyes: Negative.    Respiratory: Negative.    Cardiovascular: Negative.    Gastrointestinal: Negative.    Genitourinary: Negative.    Musculoskeletal: Negative.    Integumentary:  Negative.   Neurological: Negative.     Psychiatric/Behavioral: Negative.          Objective:      Physical Exam  Vitals reviewed.   Constitutional:       General: She is not in acute distress.     Appearance: Normal appearance.   HENT:      Head: Normocephalic.      Right Ear: External ear normal.      Left Ear: External ear normal.      Nose: Nose normal.      Mouth/Throat:      Mouth: Mucous membranes are moist.      Pharynx: Oropharynx is clear.   Eyes:      General: No scleral icterus.     Extraocular Movements: Extraocular movements intact.      Conjunctiva/sclera: Conjunctivae normal.      Pupils: Pupils are equal, round, and reactive to light.   Cardiovascular:      Rate and Rhythm: Normal rate and regular rhythm.      Pulses: Normal pulses.      Heart sounds: Normal heart sounds.   Pulmonary:      Effort: Pulmonary effort is normal. No respiratory distress.      Breath sounds: Normal breath sounds.   Abdominal:      General: Bowel sounds are normal. There is no distension.      Palpations: Abdomen is soft. There is no mass.      Tenderness: There is no abdominal tenderness. There is no right CVA tenderness or left CVA tenderness.      Hernia: No hernia is present.   Musculoskeletal:         General: Deformity (scar tissue noted to the forearm near the elbow with out eryethema, warmth, or drainage) present. No swelling, tenderness or signs of injury. Normal range of motion.      Right forearm: Normal.      Left forearm: No swelling, edema, tenderness or bony tenderness.      Right wrist: Normal.      Left wrist: No swelling, effusion, tenderness or crepitus. Normal range of motion.      Cervical back: Normal range of motion and neck supple.      Right lower leg: No edema.      Left lower leg: No edema.   Lymphadenopathy:      Cervical: No cervical adenopathy.   Skin:     General: Skin is warm and dry.      Capillary Refill: Capillary refill takes less than 2 seconds.      Findings: No erythema or lesion.   Neurological:      General: No focal  deficit present.      Mental Status: She is alert and oriented to person, place, and time. Mental status is at baseline.   Psychiatric:         Mood and Affect: Mood normal.         Behavior: Behavior normal.         Thought Content: Thought content normal.         Judgment: Judgment normal.         Assessment:       Problem List Items Addressed This Visit    None     Visit Diagnoses     Chronic osteomyelitis of forearm, left    -  Primary    Relevant Orders    CBC Auto Differential    Comprehensive Metabolic Panel    Sedimentation rate    C-Reactive Protein    Chronic antibiotic suppression              Plan:       Chronic osteomyelitis of forearm, left  -     CBC Auto Differential; Future; Expected date: 11/25/2022  -     Comprehensive Metabolic Panel; Future; Expected date: 11/25/2022  -     Sedimentation rate; Future; Expected date: 11/25/2022  -     C-Reactive Protein; Future; Expected date: 07/25/2022  Past ulnar fx 2016 with MRSA positive culture 9/2016 and bone bx culture + for MRSA 9/4/18. Completed 6 weeks of Daptomycin 6mg/kg IV daily 10/26/18. Then was started on Doxycycline 100mg 1 po BID lifelong.  Continue Doxycycline 100 mg 1 po BID   Risk vs benefits discussed with patient along with a/e's. Pt is to remain upright for at lease 30 minutes to prevent pill esophagitis and will need sun protection d/t photosensitivity with medication (pt will need to avoid direct sun exposure, wear sunscreen, wear sunglasses, wear long sleeves/skin protection, etc).  Pt verbalized understanding and agrees with plan.  RTC 4 months with Gaby. Labs 1 week before next visit (CBC, CMP, ESR, CRP)  Pt advised to contact me with any change in condition.  COVID-19 and flu precautions discussed in depth.  COVID and flu vaccine recommended   Twinrix today     Chronic antibiotic suppression    Other orders  -     doxycycline (MONODOX) 100 MG capsule; Take 1 capsule (100 mg total) by mouth every 12 (twelve) hours.  Dispense: 60  capsule; Refill: 4  -     hepatitis A and B vaccine (PF) 720 MOHSEN unit- 20 mcg/mL suspension 720 Units

## 2022-08-25 ENCOUNTER — CLINICAL SUPPORT (OUTPATIENT)
Dept: INFECTIOUS DISEASES | Facility: CLINIC | Age: 67
End: 2022-08-25
Payer: MEDICARE

## 2022-08-25 DIAGNOSIS — Z23 NEED FOR VACCINATION: Primary | ICD-10-CM

## 2022-08-25 PROCEDURE — 99212 OFFICE O/P EST SF 10 MIN: CPT | Mod: PBBFAC

## 2022-08-25 PROCEDURE — 90471 IMMUNIZATION ADMIN: CPT | Mod: PBBFAC

## 2022-08-30 DIAGNOSIS — M81.0 OSTEOPOROSIS: Primary | ICD-10-CM

## 2022-09-08 ENCOUNTER — HOSPITAL ENCOUNTER (OUTPATIENT)
Dept: RADIOLOGY | Facility: HOSPITAL | Age: 67
Discharge: HOME | End: 2022-09-08
Attending: FAMILY MEDICINE
Payer: MEDICARE

## 2022-09-08 ENCOUNTER — TELEPHONE (OUTPATIENT)
Dept: INFECTIOUS DISEASES | Facility: CLINIC | Age: 67
End: 2022-09-08
Payer: MEDICARE

## 2022-09-08 DIAGNOSIS — M81.0 OSTEOPOROSIS: ICD-10-CM

## 2022-09-08 PROCEDURE — 77080 DXA BONE DENSITY AXIAL: CPT | Mod: TC

## 2022-09-08 NOTE — TELEPHONE ENCOUNTER
Reviewed bone density scan done 9/8/22. Pt has some osteoporosis. She will need to reach out to her PCP Dr. Will who ordered the imaging for treatment plan.

## 2022-09-23 ENCOUNTER — DOCUMENTATION ONLY (OUTPATIENT)
Dept: ADMINISTRATIVE | Facility: HOSPITAL | Age: 67
End: 2022-09-23
Payer: MEDICARE

## 2022-12-14 ENCOUNTER — LAB VISIT (OUTPATIENT)
Dept: LAB | Facility: HOSPITAL | Age: 67
End: 2022-12-14
Attending: NURSE PRACTITIONER
Payer: MEDICARE

## 2022-12-14 DIAGNOSIS — M86.632: ICD-10-CM

## 2022-12-14 LAB
ALBUMIN SERPL-MCNC: 3.9 G/DL (ref 3.4–4.8)
ALBUMIN/GLOB SERPL: 1.3 RATIO (ref 1.1–2)
ALP SERPL-CCNC: 61 UNIT/L (ref 40–150)
ALT SERPL-CCNC: 12 UNIT/L (ref 0–55)
AST SERPL-CCNC: 16 UNIT/L (ref 5–34)
BASOPHILS # BLD AUTO: 0.02 X10(3)/MCL (ref 0–0.2)
BASOPHILS NFR BLD AUTO: 0.4 %
BILIRUBIN DIRECT+TOT PNL SERPL-MCNC: 0.6 MG/DL
BUN SERPL-MCNC: 12.9 MG/DL (ref 9.8–20.1)
CALCIUM SERPL-MCNC: 9.5 MG/DL (ref 8.4–10.2)
CHLORIDE SERPL-SCNC: 105 MMOL/L (ref 98–107)
CO2 SERPL-SCNC: 24 MMOL/L (ref 23–31)
CREAT SERPL-MCNC: 0.79 MG/DL (ref 0.55–1.02)
CRP SERPL-MCNC: 0.2 MG/L
EOSINOPHIL # BLD AUTO: 0.16 X10(3)/MCL (ref 0–0.9)
EOSINOPHIL NFR BLD AUTO: 3.1 %
ERYTHROCYTE [DISTWIDTH] IN BLOOD BY AUTOMATED COUNT: 13.4 % (ref 11–14.5)
ERYTHROCYTE [SEDIMENTATION RATE] IN BLOOD: 19 MM/HR (ref 0–20)
GFR SERPLBLD CREATININE-BSD FMLA CKD-EPI: >60 MLS/MIN/1.73/M2
GLOBULIN SER-MCNC: 3 GM/DL (ref 2.4–3.5)
GLUCOSE SERPL-MCNC: 105 MG/DL (ref 82–115)
HCT VFR BLD AUTO: 38.1 % (ref 37–47)
HGB BLD-MCNC: 12.4 GM/DL (ref 12–16)
IMM GRANULOCYTES # BLD AUTO: 0 X10(3)/MCL (ref 0–0.04)
IMM GRANULOCYTES NFR BLD AUTO: 0 %
LYMPHOCYTES # BLD AUTO: 1.37 X10(3)/MCL (ref 0.6–4.6)
LYMPHOCYTES NFR BLD AUTO: 26.3 %
MCH RBC QN AUTO: 29.4 PG
MCHC RBC AUTO-ENTMCNC: 32.5 MG/DL (ref 33–36)
MCV RBC AUTO: 90.3 FL (ref 80–94)
MONOCYTES # BLD AUTO: 0.49 X10(3)/MCL (ref 0.1–1.3)
MONOCYTES NFR BLD AUTO: 9.4 %
NEUTROPHILS # BLD AUTO: 3.16 X10(3)/MCL (ref 2.1–9.2)
NEUTROPHILS NFR BLD AUTO: 60.8 %
PLATELET # BLD AUTO: 221 X10(3)/MCL (ref 140–371)
PMV BLD AUTO: 8.7 FL (ref 9.4–12.4)
POTASSIUM SERPL-SCNC: 4.1 MMOL/L (ref 3.5–5.1)
PROT SERPL-MCNC: 6.9 GM/DL (ref 5.8–7.6)
RBC # BLD AUTO: 4.22 X10(6)/MCL (ref 4.2–5.4)
SODIUM SERPL-SCNC: 138 MMOL/L (ref 136–145)
WBC # SPEC AUTO: 5.2 X10(3)/MCL (ref 4.5–11.5)

## 2022-12-14 PROCEDURE — 85025 COMPLETE CBC W/AUTO DIFF WBC: CPT

## 2022-12-14 PROCEDURE — 86140 C-REACTIVE PROTEIN: CPT

## 2022-12-14 PROCEDURE — 36415 COLL VENOUS BLD VENIPUNCTURE: CPT

## 2022-12-14 PROCEDURE — 80053 COMPREHEN METABOLIC PANEL: CPT

## 2022-12-14 PROCEDURE — 85651 RBC SED RATE NONAUTOMATED: CPT

## 2022-12-15 ENCOUNTER — OFFICE VISIT (OUTPATIENT)
Dept: INFECTIOUS DISEASES | Facility: CLINIC | Age: 67
End: 2022-12-15
Payer: MEDICARE

## 2022-12-15 VITALS
DIASTOLIC BLOOD PRESSURE: 69 MMHG | HEART RATE: 93 BPM | BODY MASS INDEX: 20.51 KG/M2 | RESPIRATION RATE: 16 BRPM | SYSTOLIC BLOOD PRESSURE: 110 MMHG | WEIGHT: 120.13 LBS | TEMPERATURE: 98 F | HEIGHT: 64 IN

## 2022-12-15 DIAGNOSIS — M86.632 CHRONIC OSTEOMYELITIS INVOLVING FOREARM, LEFT: Primary | ICD-10-CM

## 2022-12-15 DIAGNOSIS — Z79.2 CHRONIC ANTIBIOTIC SUPPRESSION: ICD-10-CM

## 2022-12-15 PROCEDURE — 99214 OFFICE O/P EST MOD 30 MIN: CPT | Mod: PBBFAC | Performed by: NURSE PRACTITIONER

## 2022-12-15 PROCEDURE — 99213 PR OFFICE/OUTPT VISIT, EST, LEVL III, 20-29 MIN: ICD-10-PCS | Mod: S$PBB,,, | Performed by: NURSE PRACTITIONER

## 2022-12-15 PROCEDURE — 99213 OFFICE O/P EST LOW 20 MIN: CPT | Mod: S$PBB,,, | Performed by: NURSE PRACTITIONER

## 2022-12-15 RX ORDER — CHOLECALCIFEROL (VITAMIN D3) 25 MCG
1000 TABLET ORAL DAILY
COMMUNITY
End: 2024-01-08

## 2022-12-15 RX ORDER — DOXYCYCLINE 100 MG/1
100 CAPSULE ORAL EVERY 12 HOURS
Qty: 60 CAPSULE | Refills: 4 | Status: SHIPPED | OUTPATIENT
Start: 2022-12-15 | End: 2023-04-18

## 2022-12-15 RX ORDER — CALCIUM CARBONATE 200(500)MG
1 TABLET,CHEWABLE ORAL DAILY
COMMUNITY
End: 2024-01-08

## 2022-12-15 NOTE — PROGRESS NOTES
Subjective:       Patient ID: Maryellen Velez is a 67 y.o. female.    Chief Complaint: Follow-up (Osteo/)    Ms. Velez is a 66 y/o WF here for evaluation of left arm osteomyelitis. Hx of past ulnar fx 2016 with MRSA positive culture 9/2016 and bone bx culture + for MRSA 9/4/18. Pt completed 6 weeks of Daptomycin 6mg/kg IV daily 10/26/18.  Then she was started on Doxycycline 100mg 1 po BID lifelong. She is tolerating the meds well without any complaints. Denies fever, chills, headaches, visual problems, N/V/D, SOB, cough, abdominal pain, drainage/erythema/warmth from the healed surgical site. Pt tells me she has been taking care of her twin sister who was recently dx with esophageal/stomach cancer.  Reviewed labs from 12/14/22 sed rate 19, CRP 0.20, CBC WNL.  Pt tells me she is tolerating the Doxycycline well with no problems. Pt is due for a COVID and flu vaccine. However, she is not amenable.  Twinrix #3 done 1/25/23. PCP is Dr. Elias in [place]. Cervical pap was done by Dr. Elias. Colonoscopy 2017 in [place] by Dr. Childs.      I spent a total of 22 minutes on the day of the visit.This includes face to face time and non-face to face time preparing to see the patient (eg, review of tests), obtaining and/or reviewing separately obtained history, documenting clinical information in the electronic or other health record, independently interpreting results and communicating results to the patient/family/caregiver, or care coordinator.    Review of Systems   Constitutional: Negative.    HENT: Negative.     Eyes: Negative.    Respiratory: Negative.     Cardiovascular: Negative.    Gastrointestinal: Negative.    Genitourinary: Negative.    Musculoskeletal: Negative.    Integumentary:  Negative.   Neurological: Negative.    Psychiatric/Behavioral: Negative.         Objective:      Physical Exam  Vitals reviewed.   Constitutional:       General: She is not in acute distress.     Appearance: Normal  appearance.   HENT:      Head: Normocephalic.      Right Ear: External ear normal.      Left Ear: External ear normal.      Nose: Nose normal.      Mouth/Throat:      Mouth: Mucous membranes are moist.      Pharynx: Oropharynx is clear.      Comments: Upper and lower dentures noted   Eyes:      General: No scleral icterus.     Extraocular Movements: Extraocular movements intact.      Conjunctiva/sclera: Conjunctivae normal.      Pupils: Pupils are equal, round, and reactive to light.   Cardiovascular:      Rate and Rhythm: Normal rate and regular rhythm.      Pulses: Normal pulses.      Heart sounds: Normal heart sounds.   Pulmonary:      Effort: Pulmonary effort is normal. No respiratory distress.      Breath sounds: Normal breath sounds.   Abdominal:      General: Bowel sounds are normal. There is no distension.      Palpations: Abdomen is soft. There is no mass.      Tenderness: There is no abdominal tenderness. There is no right CVA tenderness or left CVA tenderness.      Hernia: No hernia is present.   Musculoskeletal:         General: No tenderness or signs of injury. Normal range of motion.      Cervical back: Normal range of motion and neck supple.      Right lower leg: No edema.      Left lower leg: No edema.   Lymphadenopathy:      Cervical: No cervical adenopathy.   Skin:     General: Skin is warm and dry.      Capillary Refill: Capillary refill takes less than 2 seconds.      Findings: No erythema or lesion.   Neurological:      General: No focal deficit present.      Mental Status: She is alert and oriented to person, place, and time. Mental status is at baseline.   Psychiatric:         Mood and Affect: Mood normal.         Behavior: Behavior normal.         Thought Content: Thought content normal.         Judgment: Judgment normal.       Assessment:       Problem List Items Addressed This Visit    None  Visit Diagnoses       Chronic osteomyelitis involving forearm, left    -  Primary    Relevant Orders     CBC Auto Differential    Comprehensive Metabolic Panel    C-Reactive Protein    Sedimentation rate    Chronic antibiotic suppression        Relevant Medications    doxycycline (MONODOX) 100 MG capsule            Plan:       Chronic osteomyelitis involving forearm, left  -     CBC Auto Differential; Future; Expected date: 03/01/2023  -     Comprehensive Metabolic Panel; Future; Expected date: 03/01/2023  -     C-Reactive Protein; Future; Expected date: 03/01/2023  -     Sedimentation rate; Future; Expected date: 03/01/2023  Continue Doxycycline 100 mg 1 po q day   RTC 4 months with Gaby, labs 1 week before next visit  COVID and flu precautions discussed   Vaccines recommended. Pt is not amenable    Chronic antibiotic suppression  -     doxycycline (MONODOX) 100 MG capsule; Take 1 capsule (100 mg total) by mouth every 12 (twelve) hours.  Dispense: 60 capsule; Refill: 4  Continue Doxycycline 100 mg 1 po BID   Risk vs benefits discussed with patient along with a/e's. Pt is to remain upright for at lease 30 minutes to prevent pill esophagitis and will need sun protection d/t photosensitivity with medication (pt will need to avoid direct sun exposure, wear sunscreen, wear sunglasses, wear long sleeves/skin protection, etc).  Pt verbalized understanding and agrees with plan.

## 2023-01-25 ENCOUNTER — CLINICAL SUPPORT (OUTPATIENT)
Dept: INFECTIOUS DISEASES | Facility: CLINIC | Age: 68
End: 2023-01-25
Payer: MEDICARE

## 2023-01-25 DIAGNOSIS — Z23 NEED FOR VACCINATION: Primary | ICD-10-CM

## 2023-01-25 PROCEDURE — 90636 HEP A/HEP B VACC ADULT IM: CPT | Mod: PBBFAC

## 2023-01-25 PROCEDURE — 90471 IMMUNIZATION ADMIN: CPT | Mod: PBBFAC

## 2023-04-13 ENCOUNTER — LAB VISIT (OUTPATIENT)
Dept: LAB | Facility: HOSPITAL | Age: 68
End: 2023-04-13
Attending: NURSE PRACTITIONER
Payer: MEDICARE

## 2023-04-13 DIAGNOSIS — M86.632 CHRONIC OSTEOMYELITIS INVOLVING FOREARM, LEFT: ICD-10-CM

## 2023-04-13 LAB
ALBUMIN SERPL-MCNC: 4.1 G/DL (ref 3.4–4.8)
ALBUMIN/GLOB SERPL: 1.2 RATIO (ref 1.1–2)
ALP SERPL-CCNC: 71 UNIT/L (ref 40–150)
ALT SERPL-CCNC: 13 UNIT/L (ref 0–55)
AST SERPL-CCNC: 18 UNIT/L (ref 5–34)
BASOPHILS # BLD AUTO: 0.02 X10(3)/MCL (ref 0–0.2)
BASOPHILS NFR BLD AUTO: 0.3 %
BILIRUBIN DIRECT+TOT PNL SERPL-MCNC: 0.7 MG/DL
BUN SERPL-MCNC: 12.4 MG/DL (ref 9.8–20.1)
CALCIUM SERPL-MCNC: 9.9 MG/DL (ref 8.4–10.2)
CHLORIDE SERPL-SCNC: 106 MMOL/L (ref 98–107)
CO2 SERPL-SCNC: 25 MMOL/L (ref 23–31)
CREAT SERPL-MCNC: 0.82 MG/DL (ref 0.55–1.02)
CRP SERPL-MCNC: 0.3 MG/L
EOSINOPHIL # BLD AUTO: 0.31 X10(3)/MCL (ref 0–0.9)
EOSINOPHIL NFR BLD AUTO: 4.9 %
ERYTHROCYTE [DISTWIDTH] IN BLOOD BY AUTOMATED COUNT: 13 % (ref 11.5–17)
ERYTHROCYTE [SEDIMENTATION RATE] IN BLOOD: 20 MM/HR (ref 0–20)
GFR SERPLBLD CREATININE-BSD FMLA CKD-EPI: >60 MLS/MIN/1.73/M2
GLOBULIN SER-MCNC: 3.3 GM/DL (ref 2.4–3.5)
GLUCOSE SERPL-MCNC: 97 MG/DL (ref 82–115)
HCT VFR BLD AUTO: 41.2 % (ref 37–47)
HGB BLD-MCNC: 13.4 G/DL (ref 12–16)
IMM GRANULOCYTES # BLD AUTO: 0.01 X10(3)/MCL (ref 0–0.04)
IMM GRANULOCYTES NFR BLD AUTO: 0.2 %
LYMPHOCYTES # BLD AUTO: 1.17 X10(3)/MCL (ref 0.6–4.6)
LYMPHOCYTES NFR BLD AUTO: 18.6 %
MCH RBC QN AUTO: 28.8 PG (ref 27–31)
MCHC RBC AUTO-ENTMCNC: 32.5 G/DL (ref 33–36)
MCV RBC AUTO: 88.6 FL (ref 80–94)
MONOCYTES # BLD AUTO: 0.49 X10(3)/MCL (ref 0.1–1.3)
MONOCYTES NFR BLD AUTO: 7.8 %
NEUTROPHILS # BLD AUTO: 4.28 X10(3)/MCL (ref 2.1–9.2)
NEUTROPHILS NFR BLD AUTO: 68.2 %
PLATELET # BLD AUTO: 222 X10(3)/MCL (ref 130–400)
PMV BLD AUTO: 8.7 FL (ref 7.4–10.4)
POTASSIUM SERPL-SCNC: 4 MMOL/L (ref 3.5–5.1)
PROT SERPL-MCNC: 7.4 GM/DL (ref 5.8–7.6)
RBC # BLD AUTO: 4.65 X10(6)/MCL (ref 4.2–5.4)
SODIUM SERPL-SCNC: 139 MMOL/L (ref 136–145)
WBC # SPEC AUTO: 6.3 X10(3)/MCL (ref 4.5–11.5)

## 2023-04-13 PROCEDURE — 85651 RBC SED RATE NONAUTOMATED: CPT

## 2023-04-13 PROCEDURE — 80053 COMPREHEN METABOLIC PANEL: CPT

## 2023-04-13 PROCEDURE — 36415 COLL VENOUS BLD VENIPUNCTURE: CPT

## 2023-04-13 PROCEDURE — 86140 C-REACTIVE PROTEIN: CPT

## 2023-04-13 PROCEDURE — 85025 COMPLETE CBC W/AUTO DIFF WBC: CPT

## 2023-04-18 ENCOUNTER — OFFICE VISIT (OUTPATIENT)
Dept: INFECTIOUS DISEASES | Facility: CLINIC | Age: 68
End: 2023-04-18
Payer: MEDICARE

## 2023-04-18 VITALS
WEIGHT: 118.69 LBS | HEIGHT: 64 IN | RESPIRATION RATE: 16 BRPM | BODY MASS INDEX: 20.26 KG/M2 | HEART RATE: 97 BPM | DIASTOLIC BLOOD PRESSURE: 65 MMHG | SYSTOLIC BLOOD PRESSURE: 113 MMHG | TEMPERATURE: 98 F

## 2023-04-18 DIAGNOSIS — M86.632 CHRONIC OSTEOMYELITIS INVOLVING FOREARM, LEFT: Primary | ICD-10-CM

## 2023-04-18 DIAGNOSIS — Z12.31 SCREENING MAMMOGRAM, ENCOUNTER FOR: ICD-10-CM

## 2023-04-18 DIAGNOSIS — Z79.2 CHRONIC ANTIBIOTIC SUPPRESSION: ICD-10-CM

## 2023-04-18 PROCEDURE — 3074F PR MOST RECENT SYSTOLIC BLOOD PRESSURE < 130 MM HG: ICD-10-PCS | Mod: CPTII,,, | Performed by: NURSE PRACTITIONER

## 2023-04-18 PROCEDURE — 3074F SYST BP LT 130 MM HG: CPT | Mod: CPTII,,, | Performed by: NURSE PRACTITIONER

## 2023-04-18 PROCEDURE — 1101F PT FALLS ASSESS-DOCD LE1/YR: CPT | Mod: CPTII,,, | Performed by: NURSE PRACTITIONER

## 2023-04-18 PROCEDURE — 99213 OFFICE O/P EST LOW 20 MIN: CPT | Mod: S$PBB,,, | Performed by: NURSE PRACTITIONER

## 2023-04-18 PROCEDURE — 3078F DIAST BP <80 MM HG: CPT | Mod: CPTII,,, | Performed by: NURSE PRACTITIONER

## 2023-04-18 PROCEDURE — 99214 OFFICE O/P EST MOD 30 MIN: CPT | Mod: PBBFAC | Performed by: NURSE PRACTITIONER

## 2023-04-18 PROCEDURE — 99213 PR OFFICE/OUTPT VISIT, EST, LEVL III, 20-29 MIN: ICD-10-PCS | Mod: S$PBB,,, | Performed by: NURSE PRACTITIONER

## 2023-04-18 PROCEDURE — 1126F AMNT PAIN NOTED NONE PRSNT: CPT | Mod: CPTII,,, | Performed by: NURSE PRACTITIONER

## 2023-04-18 PROCEDURE — 1101F PR PT FALLS ASSESS DOC 0-1 FALLS W/OUT INJ PAST YR: ICD-10-PCS | Mod: CPTII,,, | Performed by: NURSE PRACTITIONER

## 2023-04-18 PROCEDURE — 3008F PR BODY MASS INDEX (BMI) DOCUMENTED: ICD-10-PCS | Mod: CPTII,,, | Performed by: NURSE PRACTITIONER

## 2023-04-18 PROCEDURE — 1159F PR MEDICATION LIST DOCUMENTED IN MEDICAL RECORD: ICD-10-PCS | Mod: CPTII,,, | Performed by: NURSE PRACTITIONER

## 2023-04-18 PROCEDURE — 3008F BODY MASS INDEX DOCD: CPT | Mod: CPTII,,, | Performed by: NURSE PRACTITIONER

## 2023-04-18 PROCEDURE — 1160F RVW MEDS BY RX/DR IN RCRD: CPT | Mod: CPTII,,, | Performed by: NURSE PRACTITIONER

## 2023-04-18 PROCEDURE — 3078F PR MOST RECENT DIASTOLIC BLOOD PRESSURE < 80 MM HG: ICD-10-PCS | Mod: CPTII,,, | Performed by: NURSE PRACTITIONER

## 2023-04-18 PROCEDURE — 3288F FALL RISK ASSESSMENT DOCD: CPT | Mod: CPTII,,, | Performed by: NURSE PRACTITIONER

## 2023-04-18 PROCEDURE — 1160F PR REVIEW ALL MEDS BY PRESCRIBER/CLIN PHARMACIST DOCUMENTED: ICD-10-PCS | Mod: CPTII,,, | Performed by: NURSE PRACTITIONER

## 2023-04-18 PROCEDURE — 1159F MED LIST DOCD IN RCRD: CPT | Mod: CPTII,,, | Performed by: NURSE PRACTITIONER

## 2023-04-18 PROCEDURE — 3288F PR FALLS RISK ASSESSMENT DOCUMENTED: ICD-10-PCS | Mod: CPTII,,, | Performed by: NURSE PRACTITIONER

## 2023-04-18 PROCEDURE — 1126F PR PAIN SEVERITY QUANTIFIED, NO PAIN PRESENT: ICD-10-PCS | Mod: CPTII,,, | Performed by: NURSE PRACTITIONER

## 2023-04-18 RX ORDER — DOXYCYCLINE 100 MG/1
100 CAPSULE ORAL EVERY 12 HOURS
Qty: 60 CAPSULE | Refills: 4 | Status: SHIPPED | OUTPATIENT
Start: 2023-04-18 | End: 2023-09-05 | Stop reason: SDUPTHER

## 2023-04-18 NOTE — PROGRESS NOTES
Subjective     Patient ID: Maryellen Velez is a 67 y.o. female.    Chief Complaint: Follow-up (osteo)    Ms. Velez is a 68 y/o WF here for evaluation of left arm osteomyelitis. Hx of past ulnar fx 2016 with MRSA positive culture 9/2016 and bone bx culture + for MRSA 9/4/18. Pt completed 6 weeks of Daptomycin 6mg/kg IV daily 10/26/18.  Then she was started on Doxycycline 100mg 1 po BID lifelong. Pt is tolerating the meds well without any complaints. She denies fever, chills, headaches, visual problems, N/V/D, SOB, cough, abdominal pain, drainage/erythema/warmth from the healed surgical site. Pt is very sad today as her twin sister passed away a few days ago of cancer and her brother was recently dx with cancer.  I reviewed labs from 4/13/23 CBC/CMP WNL, sed rate 20, CRP 0.30.  She tells me she is tolerating the Doxycycline well with no problems. Pt is due for a COVID and flu vaccine. However, she is not amenable.  PCP is Dr. Elias in [place]. Cervical pap was done by Dr. Elias. Colonoscopy 2017 in [place] by Dr. Childs. She is due for a mammogram. I will order.      Review of Systems   Constitutional: Negative.    HENT: Negative.     Eyes: Negative.    Respiratory: Negative.     Cardiovascular: Negative.    Gastrointestinal: Negative.    Genitourinary: Negative.    Musculoskeletal: Negative.    Integumentary:  Negative.   Neurological: Negative.    Psychiatric/Behavioral: Negative.          Objective     Physical Exam  Vitals reviewed.   Constitutional:       General: She is not in acute distress.     Appearance: Normal appearance.   HENT:      Head: Normocephalic.      Right Ear: External ear normal.      Left Ear: External ear normal.      Nose: Nose normal.      Mouth/Throat:      Mouth: Mucous membranes are moist.      Pharynx: Oropharynx is clear.      Comments: Dentures noted   Eyes:      General: No scleral icterus.     Extraocular Movements: Extraocular movements intact.       Conjunctiva/sclera: Conjunctivae normal.      Pupils: Pupils are equal, round, and reactive to light.   Cardiovascular:      Rate and Rhythm: Normal rate and regular rhythm.      Pulses: Normal pulses.      Heart sounds: Normal heart sounds.   Pulmonary:      Effort: Pulmonary effort is normal. No respiratory distress.      Breath sounds: Normal breath sounds.   Abdominal:      General: Bowel sounds are normal. There is no distension.      Palpations: Abdomen is soft. There is no mass.      Tenderness: There is no abdominal tenderness. There is no right CVA tenderness or left CVA tenderness.      Hernia: No hernia is present.   Musculoskeletal:         General: No tenderness or signs of injury. Normal range of motion.      Cervical back: Normal range of motion and neck supple.      Right lower leg: No edema.      Left lower leg: No edema.   Lymphadenopathy:      Cervical: No cervical adenopathy.   Skin:     General: Skin is warm and dry.      Capillary Refill: Capillary refill takes less than 2 seconds.      Findings: No erythema or lesion.   Neurological:      General: No focal deficit present.      Mental Status: She is alert and oriented to person, place, and time. Mental status is at baseline.   Psychiatric:         Mood and Affect: Mood normal.         Behavior: Behavior normal.         Thought Content: Thought content normal.         Judgment: Judgment normal.          Assessment and Plan     Problem List Items Addressed This Visit    None      Chronic osteomyelitis involving forearm, left  -     doxycycline (MONODOX) 100 MG capsule; Take 1 capsule (100 mg total) by mouth every 12 (twelve) hours.  Dispense: 60 capsule; Refill: 4  -     CBC Auto Differential; Future; Expected date: 06/01/2023  -     Comprehensive Metabolic Panel; Future; Expected date: 06/01/2023  -     C-Reactive Protein; Future; Expected date: 06/01/2023  -     Sedimentation rate; Future; Expected date: 06/01/2023  Continue Doxycycline 100 mg  1 po BID   RTC 4 months with Gaby via telemed, labs 1 week before next visit   COVID and flu precautions discussed  Vaccines recommended     Chronic antibiotic suppression  -     doxycycline (MONODOX) 100 MG capsule; Take 1 capsule (100 mg total) by mouth every 12 (twelve) hours.  Dispense: 60 capsule; Refill: 4  Continue Doxycycline 100 mg 1 po BID   Risk vs benefits discussed with patient along with a/e's. Pt is to remain upright for at lease 30 minutes to prevent pill esophagitis and will need sun protection d/t photosensitivity with medication (pt will need to avoid direct sun exposure, wear sunscreen, wear sunglasses, wear long sleeves/skin protection, etc).  Pt verbalized understanding and agrees with plan.     Screening mammogram, encounter for  -     Mammo Digital Screening Bilat; Future; Expected date: 04/18/2023

## 2023-05-02 ENCOUNTER — HOSPITAL ENCOUNTER (OUTPATIENT)
Dept: RADIOLOGY | Facility: HOSPITAL | Age: 68
Discharge: HOME | End: 2023-05-02
Attending: NURSE PRACTITIONER
Payer: MEDICARE

## 2023-05-02 DIAGNOSIS — Z12.31 SCREENING MAMMOGRAM, ENCOUNTER FOR: ICD-10-CM

## 2023-05-02 PROCEDURE — 77063 BREAST TOMOSYNTHESIS BI: CPT | Mod: 26,,, | Performed by: RADIOLOGY

## 2023-05-02 PROCEDURE — 77067 SCR MAMMO BI INCL CAD: CPT | Mod: TC

## 2023-05-02 PROCEDURE — 77067 MAMMO DIGITAL SCREENING BILAT WITH TOMO: ICD-10-PCS | Mod: 26,,, | Performed by: RADIOLOGY

## 2023-05-02 PROCEDURE — 77063 MAMMO DIGITAL SCREENING BILAT WITH TOMO: ICD-10-PCS | Mod: 26,,, | Performed by: RADIOLOGY

## 2023-05-02 PROCEDURE — 77067 SCR MAMMO BI INCL CAD: CPT | Mod: 26,,, | Performed by: RADIOLOGY

## 2023-08-26 ENCOUNTER — LAB VISIT (OUTPATIENT)
Dept: LAB | Facility: HOSPITAL | Age: 68
End: 2023-08-26
Attending: FAMILY MEDICINE
Payer: MEDICARE

## 2023-08-26 DIAGNOSIS — R53.1 ASTHENIA: ICD-10-CM

## 2023-08-26 DIAGNOSIS — G25.81 RESTLESS LEGS: ICD-10-CM

## 2023-08-26 DIAGNOSIS — M79.602 LEFT ARM PAIN: ICD-10-CM

## 2023-08-26 DIAGNOSIS — M65.9 SAPHO SYNDROME: ICD-10-CM

## 2023-08-26 DIAGNOSIS — J44.89 OBSTRUCTIVE CHRONIC BRONCHITIS WITHOUT EXACERBATION: ICD-10-CM

## 2023-08-26 DIAGNOSIS — M85.80 SAPHO SYNDROME: ICD-10-CM

## 2023-08-26 DIAGNOSIS — Z87.42 PERSONAL HISTORY OF REPRODUCTIVE AND OBSTETRICAL PROBLEMS: ICD-10-CM

## 2023-08-26 DIAGNOSIS — L70.9 SAPHO SYNDROME: ICD-10-CM

## 2023-08-26 DIAGNOSIS — M86.9 SAPHO SYNDROME: ICD-10-CM

## 2023-08-26 DIAGNOSIS — L40.3 SAPHO SYNDROME: ICD-10-CM

## 2023-08-26 DIAGNOSIS — K21.9 GASTROESOPHAGEAL REFLUX DISEASE, UNSPECIFIED WHETHER ESOPHAGITIS PRESENT: ICD-10-CM

## 2023-08-26 DIAGNOSIS — E78.5 HYPERLIPIDEMIA, UNSPECIFIED HYPERLIPIDEMIA TYPE: ICD-10-CM

## 2023-08-26 LAB
ALBUMIN SERPL-MCNC: 3.9 G/DL (ref 3.4–4.8)
ALBUMIN/GLOB SERPL: 1.5 RATIO (ref 1.1–2)
ALP SERPL-CCNC: 68 UNIT/L (ref 40–150)
ALT SERPL-CCNC: 12 UNIT/L (ref 0–55)
APPEARANCE UR: CLEAR
AST SERPL-CCNC: 16 UNIT/L (ref 5–34)
BACTERIA #/AREA URNS AUTO: ABNORMAL /HPF
BASOPHILS # BLD AUTO: 0.06 X10(3)/MCL
BASOPHILS NFR BLD AUTO: 1.4 %
BILIRUB SERPL-MCNC: 0.5 MG/DL
BILIRUB UR QL STRIP.AUTO: NEGATIVE
BUN SERPL-MCNC: 17.3 MG/DL (ref 9.8–20.1)
CALCIUM SERPL-MCNC: 9.1 MG/DL (ref 8.4–10.2)
CHLORIDE SERPL-SCNC: 108 MMOL/L (ref 98–107)
CHOLEST SERPL-MCNC: 203 MG/DL
CHOLEST/HDLC SERPL: 4 {RATIO} (ref 0–5)
CO2 SERPL-SCNC: 26 MMOL/L (ref 23–31)
COLOR UR: YELLOW
CREAT SERPL-MCNC: 0.72 MG/DL (ref 0.55–1.02)
EOSINOPHIL # BLD AUTO: 0.2 X10(3)/MCL (ref 0–0.9)
EOSINOPHIL NFR BLD AUTO: 4.7 %
ERYTHROCYTE [DISTWIDTH] IN BLOOD BY AUTOMATED COUNT: 13.1 % (ref 11.5–17)
GFR SERPLBLD CREATININE-BSD FMLA CKD-EPI: >60 MLS/MIN/1.73/M2
GLOBULIN SER-MCNC: 2.6 GM/DL (ref 2.4–3.5)
GLUCOSE SERPL-MCNC: 87 MG/DL (ref 82–115)
GLUCOSE UR QL STRIP.AUTO: NEGATIVE
HCT VFR BLD AUTO: 39.8 % (ref 37–47)
HDLC SERPL-MCNC: 54 MG/DL (ref 35–60)
HGB BLD-MCNC: 12.9 G/DL (ref 12–16)
IMM GRANULOCYTES # BLD AUTO: 0 X10(3)/MCL (ref 0–0.04)
IMM GRANULOCYTES NFR BLD AUTO: 0 %
KETONES UR QL STRIP.AUTO: NEGATIVE
LDLC SERPL CALC-MCNC: 134 MG/DL (ref 50–140)
LEUKOCYTE ESTERASE UR QL STRIP.AUTO: NEGATIVE
LYMPHOCYTES # BLD AUTO: 1.39 X10(3)/MCL (ref 0.6–4.6)
LYMPHOCYTES NFR BLD AUTO: 32.4 %
MCH RBC QN AUTO: 29.2 PG (ref 27–31)
MCHC RBC AUTO-ENTMCNC: 32.4 G/DL (ref 33–36)
MCV RBC AUTO: 90 FL (ref 80–94)
MONOCYTES # BLD AUTO: 0.41 X10(3)/MCL (ref 0.1–1.3)
MONOCYTES NFR BLD AUTO: 9.6 %
NEUTROPHILS # BLD AUTO: 2.23 X10(3)/MCL (ref 2.1–9.2)
NEUTROPHILS NFR BLD AUTO: 51.9 %
NITRITE UR QL STRIP.AUTO: POSITIVE
PH UR STRIP.AUTO: 7 [PH]
PLATELET # BLD AUTO: 217 X10(3)/MCL (ref 130–400)
PMV BLD AUTO: 8.7 FL (ref 7.4–10.4)
POTASSIUM SERPL-SCNC: 4.1 MMOL/L (ref 3.5–5.1)
PROT SERPL-MCNC: 6.5 GM/DL (ref 5.8–7.6)
PROT UR QL STRIP.AUTO: NEGATIVE
RBC # BLD AUTO: 4.42 X10(6)/MCL (ref 4.2–5.4)
RBC #/AREA URNS AUTO: ABNORMAL /HPF
RBC UR QL AUTO: NEGATIVE
SODIUM SERPL-SCNC: 140 MMOL/L (ref 136–145)
SP GR UR STRIP.AUTO: 1.02
SQUAMOUS #/AREA URNS AUTO: ABNORMAL /HPF
TRIGL SERPL-MCNC: 77 MG/DL (ref 37–140)
UROBILINOGEN UR STRIP-ACNC: 0.2
VLDLC SERPL CALC-MCNC: 15 MG/DL
WBC # SPEC AUTO: 4.29 X10(3)/MCL (ref 4.5–11.5)
WBC #/AREA URNS AUTO: ABNORMAL /HPF

## 2023-08-26 PROCEDURE — 80053 COMPREHEN METABOLIC PANEL: CPT

## 2023-08-26 PROCEDURE — 84443 ASSAY THYROID STIM HORMONE: CPT

## 2023-08-26 PROCEDURE — 85025 COMPLETE CBC W/AUTO DIFF WBC: CPT

## 2023-08-26 PROCEDURE — 81001 URINALYSIS AUTO W/SCOPE: CPT

## 2023-08-26 PROCEDURE — 80061 LIPID PANEL: CPT

## 2023-08-26 PROCEDURE — 36415 COLL VENOUS BLD VENIPUNCTURE: CPT

## 2023-08-28 ENCOUNTER — TELEPHONE (OUTPATIENT)
Dept: INFECTIOUS DISEASES | Facility: CLINIC | Age: 68
End: 2023-08-28
Payer: MEDICARE

## 2023-08-28 DIAGNOSIS — N39.0 URINARY TRACT INFECTION WITHOUT HEMATURIA, SITE UNSPECIFIED: Primary | ICD-10-CM

## 2023-08-28 RX ORDER — NITROFURANTOIN 25; 75 MG/1; MG/1
100 CAPSULE ORAL 2 TIMES DAILY
Qty: 14 CAPSULE | Refills: 0 | Status: SHIPPED | OUTPATIENT
Start: 2023-08-28 | End: 2023-09-04

## 2023-08-28 NOTE — TELEPHONE ENCOUNTER
----- Message from ELIAS Castillo sent at 8/28/2023  9:29 AM CDT -----  Reviewed labs. UA is positive for nitrites and bacteria with few squamous cells.  Pt will need to be treated for UTI.  Please advise patient to drink lots of water, urinate regularly without holding urine, urinate before & after sex, and wipe from front to back. She will need to complete the entire course of antibiotics (Macrobid 100 mg 1 po BID x 7 days)as prescribed. Please contact patient with results and treatment plan.

## 2023-08-28 NOTE — PROGRESS NOTES
Reviewed labs. UA is positive for nitrites and bacteria with few squamous cells.  Pt will need to be treated for UTI.  Please advise patient to drink lots of water, urinate regularly without holding urine, urinate before & after sex, and wipe from front to back. She will need to complete the entire course of antibiotics (Macrobid 100 mg 1 po BID x 7 days)as prescribed. Please contact patient with results and treatment plan.

## 2023-08-28 NOTE — TELEPHONE ENCOUNTER
Patient informed of results and providers recommendations. All questions answered. Patient verbalized understanding. Also sent this message in portal for pt to review if needed

## 2023-08-29 LAB — TSH SERPL-ACNC: 1.06 UIU/ML (ref 0.35–4.94)

## 2023-09-02 ENCOUNTER — LAB VISIT (OUTPATIENT)
Dept: LAB | Facility: HOSPITAL | Age: 68
End: 2023-09-02
Attending: NURSE PRACTITIONER
Payer: MEDICARE

## 2023-09-02 DIAGNOSIS — M86.632 CHRONIC OSTEOMYELITIS INVOLVING FOREARM, LEFT: ICD-10-CM

## 2023-09-02 LAB
ALBUMIN SERPL-MCNC: 4.1 G/DL (ref 3.4–4.8)
ALBUMIN/GLOB SERPL: 1.2 RATIO (ref 1.1–2)
ALP SERPL-CCNC: 76 UNIT/L (ref 40–150)
ALT SERPL-CCNC: 13 UNIT/L (ref 0–55)
AST SERPL-CCNC: 18 UNIT/L (ref 5–34)
BASOPHILS # BLD AUTO: 0.03 X10(3)/MCL
BASOPHILS NFR BLD AUTO: 0.6 %
BILIRUB SERPL-MCNC: 0.7 MG/DL
BUN SERPL-MCNC: 13.4 MG/DL (ref 9.8–20.1)
CALCIUM SERPL-MCNC: 9.6 MG/DL (ref 8.4–10.2)
CHLORIDE SERPL-SCNC: 106 MMOL/L (ref 98–107)
CO2 SERPL-SCNC: 23 MMOL/L (ref 23–31)
CREAT SERPL-MCNC: 0.75 MG/DL (ref 0.55–1.02)
CRP SERPL-MCNC: 0.2 MG/L
EOSINOPHIL # BLD AUTO: 0.2 X10(3)/MCL (ref 0–0.9)
EOSINOPHIL NFR BLD AUTO: 3.8 %
ERYTHROCYTE [DISTWIDTH] IN BLOOD BY AUTOMATED COUNT: 13.1 % (ref 11.5–17)
ERYTHROCYTE [SEDIMENTATION RATE] IN BLOOD: 18 MM/HR (ref 0–20)
GFR SERPLBLD CREATININE-BSD FMLA CKD-EPI: >60 MLS/MIN/1.73/M2
GLOBULIN SER-MCNC: 3.4 GM/DL (ref 2.4–3.5)
GLUCOSE SERPL-MCNC: 90 MG/DL (ref 82–115)
HCT VFR BLD AUTO: 43.1 % (ref 37–47)
HGB BLD-MCNC: 13.6 G/DL (ref 12–16)
IMM GRANULOCYTES # BLD AUTO: 0.01 X10(3)/MCL (ref 0–0.04)
IMM GRANULOCYTES NFR BLD AUTO: 0.2 %
LYMPHOCYTES # BLD AUTO: 1.47 X10(3)/MCL (ref 0.6–4.6)
LYMPHOCYTES NFR BLD AUTO: 28.3 %
MCH RBC QN AUTO: 28.4 PG (ref 27–31)
MCHC RBC AUTO-ENTMCNC: 31.6 G/DL (ref 33–36)
MCV RBC AUTO: 90 FL (ref 80–94)
MONOCYTES # BLD AUTO: 0.48 X10(3)/MCL (ref 0.1–1.3)
MONOCYTES NFR BLD AUTO: 9.2 %
NEUTROPHILS # BLD AUTO: 3.01 X10(3)/MCL (ref 2.1–9.2)
NEUTROPHILS NFR BLD AUTO: 57.9 %
PLATELET # BLD AUTO: 185 X10(3)/MCL (ref 130–400)
PMV BLD AUTO: 9.6 FL (ref 7.4–10.4)
POTASSIUM SERPL-SCNC: 4.2 MMOL/L (ref 3.5–5.1)
PROT SERPL-MCNC: 7.5 GM/DL (ref 5.8–7.6)
RBC # BLD AUTO: 4.79 X10(6)/MCL (ref 4.2–5.4)
SODIUM SERPL-SCNC: 137 MMOL/L (ref 136–145)
WBC # SPEC AUTO: 5.2 X10(3)/MCL (ref 4.5–11.5)

## 2023-09-02 PROCEDURE — 86140 C-REACTIVE PROTEIN: CPT

## 2023-09-02 PROCEDURE — 85025 COMPLETE CBC W/AUTO DIFF WBC: CPT

## 2023-09-02 PROCEDURE — 36415 COLL VENOUS BLD VENIPUNCTURE: CPT

## 2023-09-02 PROCEDURE — 80053 COMPREHEN METABOLIC PANEL: CPT

## 2023-09-02 PROCEDURE — 85652 RBC SED RATE AUTOMATED: CPT

## 2023-09-05 ENCOUNTER — OFFICE VISIT (OUTPATIENT)
Dept: INFECTIOUS DISEASES | Facility: CLINIC | Age: 68
End: 2023-09-05
Payer: MEDICARE

## 2023-09-05 DIAGNOSIS — Z79.2 CHRONIC ANTIBIOTIC SUPPRESSION: ICD-10-CM

## 2023-09-05 DIAGNOSIS — M86.632: Primary | ICD-10-CM

## 2023-09-05 PROCEDURE — 1159F MED LIST DOCD IN RCRD: CPT | Mod: CPTII,95,, | Performed by: NURSE PRACTITIONER

## 2023-09-05 PROCEDURE — 3288F PR FALLS RISK ASSESSMENT DOCUMENTED: ICD-10-PCS | Mod: CPTII,95,, | Performed by: NURSE PRACTITIONER

## 2023-09-05 PROCEDURE — 3288F FALL RISK ASSESSMENT DOCD: CPT | Mod: CPTII,95,, | Performed by: NURSE PRACTITIONER

## 2023-09-05 PROCEDURE — 1101F PR PT FALLS ASSESS DOC 0-1 FALLS W/OUT INJ PAST YR: ICD-10-PCS | Mod: CPTII,95,, | Performed by: NURSE PRACTITIONER

## 2023-09-05 PROCEDURE — 99213 OFFICE O/P EST LOW 20 MIN: CPT | Mod: 95,,, | Performed by: NURSE PRACTITIONER

## 2023-09-05 PROCEDURE — 1160F RVW MEDS BY RX/DR IN RCRD: CPT | Mod: CPTII,95,, | Performed by: NURSE PRACTITIONER

## 2023-09-05 PROCEDURE — 1160F PR REVIEW ALL MEDS BY PRESCRIBER/CLIN PHARMACIST DOCUMENTED: ICD-10-PCS | Mod: CPTII,95,, | Performed by: NURSE PRACTITIONER

## 2023-09-05 PROCEDURE — 1159F PR MEDICATION LIST DOCUMENTED IN MEDICAL RECORD: ICD-10-PCS | Mod: CPTII,95,, | Performed by: NURSE PRACTITIONER

## 2023-09-05 PROCEDURE — 99213 PR OFFICE/OUTPT VISIT, EST, LEVL III, 20-29 MIN: ICD-10-PCS | Mod: 95,,, | Performed by: NURSE PRACTITIONER

## 2023-09-05 PROCEDURE — 1101F PT FALLS ASSESS-DOCD LE1/YR: CPT | Mod: CPTII,95,, | Performed by: NURSE PRACTITIONER

## 2023-09-05 RX ORDER — DOXYCYCLINE 100 MG/1
100 CAPSULE ORAL EVERY 12 HOURS
Qty: 60 CAPSULE | Refills: 4 | Status: SHIPPED | OUTPATIENT
Start: 2023-09-05 | End: 2024-01-08 | Stop reason: SDUPTHER

## 2023-09-05 RX ORDER — PRAVASTATIN SODIUM 40 MG/1
TABLET ORAL
COMMUNITY
Start: 2023-05-26 | End: 2024-03-13

## 2023-09-05 NOTE — PROGRESS NOTES
Patient ID: Maryellen Velez 68 y.o.     Chief Complaint:   Chief Complaint   Patient presents with    Follow-up     osteo        HPI:    The patient location is: at her home in [place], [place]   The chief complaint leading to consultation is: chronic osteomyelitis of the left forearm    Visit type: audiovisual    Face to Face time with patient:   20 minutes of total time spent on the encounter, which includes face to face time and non-face to face time preparing to see the patient (eg, review of tests), Obtaining and/or reviewing separately obtained history, Documenting clinical information in the electronic or other health record, Independently interpreting results (not separately reported) and communicating results to the patient/family/caregiver, or Care coordination (not separately reported).     Each patient to whom he or she provides medical services by telemedicine is:  (1) informed of the relationship between the physician and patient and the respective role of any other health care provider with respect to management of the patient; and (2) notified that he or she may decline to receive medical services by telemedicine and may withdraw from such care at any time.    Notes:   Ms. Velez is a 69 y/o WF via telemedicine for evaluation of left arm osteomyelitis. Hx of past ulnar fx 2016 with MRSA positive culture 9/2016 and bone bx culture + for MRSA 9/4/18. Pt completed 6 weeks of Daptomycin 6mg/kg IV daily 10/26/18.  Then she was started on Doxycycline 100mg 1 po BID lifelong. She is tolerating the meds well without any complaints. Denies fever, chills, headaches, visual problems, N/V/D, SOB, cough, abdominal pain, drainage/erythema/warmth from the healed surgical site. Reviewed labs from 9/2/23 sed rate 18 and CRP 0.2. Pt is due for a COVID and flu vaccine. However, she is not amenable.  PCP is Dr. Elias in [place]. Cervical pap was done by Dr. Elias. Colonoscopy 2017 in [place] by   Amadeo. Mammogram 23 BIRADS 1.            Past Medical History:   Diagnosis Date    COPD (chronic obstructive pulmonary disease)     Hyperlipidemia     Personal history of colonic polyps 2017    Outside Records        Past Surgical History:   Procedure Laterality Date     SECTION      COLONOSCOPY  2017    Outside Records    ELBOW SURGERY Left     FRACTURE SURGERY  May 2016    NECK SURGERY  1986    SHOULDER SURGERY Left     TUBAL LIGATION  1984    WRIST SURGERY Left         Social History     Socioeconomic History    Marital status:    Tobacco Use    Smoking status: Former     Current packs/day: 0.00     Average packs/day: 1 pack/day for 89.7 years (89.7 ttl pk-yrs)     Types: Cigarettes     Start date: 1970     Quit date: 2015     Years since quittin.4    Smokeless tobacco: Never   Substance and Sexual Activity    Alcohol use: Never    Drug use: Never     Types: Marijuana    Sexual activity: Not Currently     Partners: Male     Birth control/protection: None        Family History   Problem Relation Age of Onset    No Known Problems Mother     Heart attack Father     Cancer Sister              Hyperlipidemia Sister     Hypertension Sister     Hypertension Brother     Cancer Brother     COPD Brother         Review of patient's allergies indicates:   Allergen Reactions    Shrimp Anaphylaxis    Hydromorphone      Other reaction(s): nausea/vomiting, Other (See Comments)  Pt. States that it cause dizziness      Shellfish containing products      Other reaction(s): hives/can't breathe    Meperidine Nausea Only     Other reaction(s): Memory loss/dyspepsia    Oxycodone-acetaminophen Nausea Only     Other reaction(s): nausea/vomiting  Patient is not truely allergic to medication. Has requested Norcomakes patient nauseous          Immunization History   Administered Date(s) Administered    Hepatitis A / Hepatitis B 2022, 2022, 2023    Tdap 2016         Review of Systems   Constitutional: Negative.    HENT: Negative.     Eyes: Negative.    Respiratory: Negative.     Cardiovascular: Negative.    Gastrointestinal: Negative.    Genitourinary: Negative.    Musculoskeletal: Negative.    Skin: Negative.    Neurological: Negative.    Endo/Heme/Allergies: Negative.    Psychiatric/Behavioral: Negative.     All other systems reviewed and are negative.         Objective:      LMP  (LMP Unknown)      Physical Exam  Constitutional:       Appearance: Normal appearance.   HENT:      Head: Normocephalic.   Eyes:      Conjunctiva/sclera: Conjunctivae normal.   Pulmonary:      Effort: Pulmonary effort is normal. No respiratory distress.   Musculoskeletal:         General: Normal range of motion.      Cervical back: Normal range of motion.   Neurological:      General: No focal deficit present.      Mental Status: She is alert and oriented to person, place, and time. Mental status is at baseline.   Psychiatric:         Mood and Affect: Mood normal.         Behavior: Behavior normal.         Thought Content: Thought content normal.         Judgment: Judgment normal.          Labs:   Lab Results   Component Value Date    WBC 5.20 09/02/2023    HGB 13.6 09/02/2023    HCT 43.1 09/02/2023    MCV 90.0 09/02/2023     09/02/2023       CMP  Sodium Level   Date Value Ref Range Status   09/02/2023 137 136 - 145 mmol/L Final     Potassium Level   Date Value Ref Range Status   09/02/2023 4.2 3.5 - 5.1 mmol/L Final     Carbon Dioxide   Date Value Ref Range Status   09/02/2023 23 23 - 31 mmol/L Final     Blood Urea Nitrogen   Date Value Ref Range Status   09/02/2023 13.4 9.8 - 20.1 mg/dL Final     Creatinine   Date Value Ref Range Status   09/02/2023 0.75 0.55 - 1.02 mg/dL Final     Calcium Level Total   Date Value Ref Range Status   09/02/2023 9.6 8.4 - 10.2 mg/dL Final     Albumin Level   Date Value Ref Range Status   09/02/2023 4.1 3.4 - 4.8 g/dL Final     Bilirubin Total   Date Value  Ref Range Status   09/02/2023 0.7 <=1.5 mg/dL Final     Alkaline Phosphatase   Date Value Ref Range Status   09/02/2023 76 40 - 150 unit/L Final     Aspartate Aminotransferase   Date Value Ref Range Status   09/02/2023 18 5 - 34 unit/L Final     Alanine Aminotransferase   Date Value Ref Range Status   09/02/2023 13 0 - 55 unit/L Final     eGFR   Date Value Ref Range Status   09/02/2023 >60 mls/min/1.73/m2 Final     Lab Results   Component Value Date    TSH 1.058 08/26/2023     Hep C Ab Interp   Date Value Ref Range Status   07/21/2022 Nonreactive Nonreactive Final     Cholesterol Total   Date Value Ref Range Status   08/26/2023 203 (H) <=200 mg/dL Final     HDL Cholesterol   Date Value Ref Range Status   08/26/2023 54 35 - 60 mg/dL Final     Triglyceride   Date Value Ref Range Status   08/26/2023 77 37 - 140 mg/dL Final     Cholesterol/HDL Ratio   Date Value Ref Range Status   08/26/2023 4 0 - 5 Final     Very Low Density Lipoprotein   Date Value Ref Range Status   08/26/2023 15  Final     LDL Cholesterol   Date Value Ref Range Status   08/26/2023 134.00 50.00 - 140.00 mg/dL Final       Imaging: Reviewed most recent relevant imaging studies available, notable results highlighted in this note    Medications:     Current Outpatient Medications   Medication Instructions    acetaminophen (TYLENOL) 500 mg, Oral, Every 6 hours PRN    budesonide-formoterol 160-4.5 mcg (SYMBICORT) 160-4.5 mcg/actuation HFAA Inhalation    calcium carbonate (TUMS) 200 mg calcium (500 mg) chewable tablet 1 tablet, Oral, Daily    doxycycline (MONODOX) 100 mg, Oral, Every 12 hours    pravastatin (PRAVACHOL) 40 MG tablet No dose, route, or frequency recorded.    tiotropium bromide (SPIRIVA RESPIMAT) 1.25 mcg/actuation inhaler Inhalation    vitamin D (VITAMIN D3) 1,000 Units, Oral, Daily       Assessment:       Problem List Items Addressed This Visit    None  Visit Diagnoses       Chronic osteomyelitis of left forearm    -  Primary    Relevant  Medications    doxycycline (MONODOX) 100 MG capsule    Other Relevant Orders    CBC Auto Differential    Comprehensive Metabolic Panel    C-Reactive Protein    Sedimentation rate    Chronic antibiotic suppression        Relevant Medications    doxycycline (MONODOX) 100 MG capsule               Plan:      Chronic osteomyelitis of left forearm  -     doxycycline (MONODOX) 100 MG capsule; Take 1 capsule (100 mg total) by mouth every 12 (twelve) hours.  Dispense: 60 capsule; Refill: 4  -     CBC Auto Differential; Future; Expected date: 01/01/2024  -     Comprehensive Metabolic Panel; Future; Expected date: 01/01/2024  -     C-Reactive Protein; Future; Expected date: 01/01/2024  -     Sedimentation rate; Future; Expected date: 01/01/2024  Continue Doxycycline 100 mg 1 po BID   RTC 4 months with Gaby for in office visit,  labs 1 week before next visit   COVID and flu precautions discussed  Vaccines recommended. Pt is not amenable to vaccines    Chronic antibiotic suppression  -     doxycycline (MONODOX) 100 MG capsule; Take 1 capsule (100 mg total) by mouth every 12 (twelve) hours.  Dispense: 60 capsule; Refill: 4  Continue Doxycycline 100 mg 1 po BID   Risk vs benefits discussed with patient along with a/e's. Pt is to remain upright for at lease 30 minutes to prevent pill esophagitis and will need sun protection d/t photosensitivity with medication (pt will need to avoid direct sun exposure, wear sunscreen, wear sunglasses, wear long sleeves/skin protection, etc).  Pt verbalized understanding and agrees with plan.

## 2024-01-02 ENCOUNTER — LAB VISIT (OUTPATIENT)
Dept: LAB | Facility: HOSPITAL | Age: 69
End: 2024-01-02
Attending: NURSE PRACTITIONER
Payer: MEDICARE

## 2024-01-02 DIAGNOSIS — M86.632: ICD-10-CM

## 2024-01-02 LAB
ALBUMIN SERPL-MCNC: 3.6 G/DL (ref 3.4–4.8)
ALBUMIN/GLOB SERPL: 1.2 RATIO (ref 1.1–2)
ALP SERPL-CCNC: 75 UNIT/L (ref 40–150)
ALT SERPL-CCNC: 12 UNIT/L (ref 0–55)
AST SERPL-CCNC: 16 UNIT/L (ref 5–34)
BASOPHILS # BLD AUTO: 0.02 X10(3)/MCL
BASOPHILS NFR BLD AUTO: 0.4 %
BILIRUB SERPL-MCNC: 0.6 MG/DL
BUN SERPL-MCNC: 13.2 MG/DL (ref 9.8–20.1)
CALCIUM SERPL-MCNC: 9.2 MG/DL (ref 8.4–10.2)
CHLORIDE SERPL-SCNC: 107 MMOL/L (ref 98–107)
CO2 SERPL-SCNC: 25 MMOL/L (ref 23–31)
CREAT SERPL-MCNC: 0.77 MG/DL (ref 0.55–1.02)
CRP SERPL-MCNC: 2.5 MG/L
EOSINOPHIL # BLD AUTO: 0.16 X10(3)/MCL (ref 0–0.9)
EOSINOPHIL NFR BLD AUTO: 3.3 %
ERYTHROCYTE [DISTWIDTH] IN BLOOD BY AUTOMATED COUNT: 13.2 % (ref 11.5–17)
ERYTHROCYTE [SEDIMENTATION RATE] IN BLOOD: 30 MM/HR (ref 0–20)
GFR SERPLBLD CREATININE-BSD FMLA CKD-EPI: >60 MLS/MIN/1.73/M2
GLOBULIN SER-MCNC: 2.9 GM/DL (ref 2.4–3.5)
GLUCOSE SERPL-MCNC: 114 MG/DL (ref 82–115)
HCT VFR BLD AUTO: 41.5 % (ref 37–47)
HGB BLD-MCNC: 12.9 G/DL (ref 12–16)
IMM GRANULOCYTES # BLD AUTO: 0 X10(3)/MCL (ref 0–0.04)
IMM GRANULOCYTES NFR BLD AUTO: 0 %
LYMPHOCYTES # BLD AUTO: 1.38 X10(3)/MCL (ref 0.6–4.6)
LYMPHOCYTES NFR BLD AUTO: 28.6 %
MCH RBC QN AUTO: 28.2 PG (ref 27–31)
MCHC RBC AUTO-ENTMCNC: 31.1 G/DL (ref 33–36)
MCV RBC AUTO: 90.8 FL (ref 80–94)
MONOCYTES # BLD AUTO: 0.35 X10(3)/MCL (ref 0.1–1.3)
MONOCYTES NFR BLD AUTO: 7.3 %
NEUTROPHILS # BLD AUTO: 2.91 X10(3)/MCL (ref 2.1–9.2)
NEUTROPHILS NFR BLD AUTO: 60.4 %
PLATELET # BLD AUTO: 220 X10(3)/MCL (ref 130–400)
PMV BLD AUTO: 9 FL (ref 7.4–10.4)
POTASSIUM SERPL-SCNC: 4.1 MMOL/L (ref 3.5–5.1)
PROT SERPL-MCNC: 6.5 GM/DL (ref 5.8–7.6)
RBC # BLD AUTO: 4.57 X10(6)/MCL (ref 4.2–5.4)
SODIUM SERPL-SCNC: 141 MMOL/L (ref 136–145)
WBC # SPEC AUTO: 4.82 X10(3)/MCL (ref 4.5–11.5)

## 2024-01-02 PROCEDURE — 85025 COMPLETE CBC W/AUTO DIFF WBC: CPT

## 2024-01-02 PROCEDURE — 80053 COMPREHEN METABOLIC PANEL: CPT

## 2024-01-02 PROCEDURE — 86140 C-REACTIVE PROTEIN: CPT

## 2024-01-02 PROCEDURE — 85652 RBC SED RATE AUTOMATED: CPT

## 2024-01-02 PROCEDURE — 36415 COLL VENOUS BLD VENIPUNCTURE: CPT

## 2024-01-08 ENCOUNTER — OFFICE VISIT (OUTPATIENT)
Dept: INFECTIOUS DISEASES | Facility: CLINIC | Age: 69
End: 2024-01-08
Payer: MEDICARE

## 2024-01-08 VITALS
HEART RATE: 87 BPM | HEIGHT: 64 IN | TEMPERATURE: 98 F | SYSTOLIC BLOOD PRESSURE: 129 MMHG | WEIGHT: 126.81 LBS | DIASTOLIC BLOOD PRESSURE: 80 MMHG | RESPIRATION RATE: 16 BRPM | BODY MASS INDEX: 21.65 KG/M2

## 2024-01-08 DIAGNOSIS — Z79.2 CHRONIC ANTIBIOTIC SUPPRESSION: ICD-10-CM

## 2024-01-08 DIAGNOSIS — M86.632 CHRONIC OSTEOMYELITIS INVOLVING FOREARM, LEFT: Primary | ICD-10-CM

## 2024-01-08 LAB
BASOPHILS # BLD AUTO: 0.04 X10(3)/MCL
BASOPHILS NFR BLD AUTO: 0.8 %
CRP SERPL-MCNC: 1.6 MG/L
EOSINOPHIL # BLD AUTO: 0.19 X10(3)/MCL (ref 0–0.9)
EOSINOPHIL NFR BLD AUTO: 3.6 %
ERYTHROCYTE [DISTWIDTH] IN BLOOD BY AUTOMATED COUNT: 13.2 % (ref 11.5–17)
ERYTHROCYTE [SEDIMENTATION RATE] IN BLOOD: 8 MM/HR (ref 0–20)
HCT VFR BLD AUTO: 40.2 % (ref 37–47)
HGB BLD-MCNC: 13.5 G/DL (ref 12–16)
IMM GRANULOCYTES # BLD AUTO: 0.01 X10(3)/MCL (ref 0–0.04)
IMM GRANULOCYTES NFR BLD AUTO: 0.2 %
LYMPHOCYTES # BLD AUTO: 1.65 X10(3)/MCL (ref 0.6–4.6)
LYMPHOCYTES NFR BLD AUTO: 31.1 %
MCH RBC QN AUTO: 29.7 PG (ref 27–31)
MCHC RBC AUTO-ENTMCNC: 33.6 G/DL (ref 33–36)
MCV RBC AUTO: 88.4 FL (ref 80–94)
MONOCYTES # BLD AUTO: 0.5 X10(3)/MCL (ref 0.1–1.3)
MONOCYTES NFR BLD AUTO: 9.4 %
NEUTROPHILS # BLD AUTO: 2.92 X10(3)/MCL (ref 2.1–9.2)
NEUTROPHILS NFR BLD AUTO: 54.9 %
NRBC BLD AUTO-RTO: 0 %
PLATELET # BLD AUTO: 233 X10(3)/MCL (ref 130–400)
PMV BLD AUTO: 9.2 FL (ref 7.4–10.4)
RBC # BLD AUTO: 4.55 X10(6)/MCL (ref 4.2–5.4)
WBC # SPEC AUTO: 5.31 X10(3)/MCL (ref 4.5–11.5)

## 2024-01-08 PROCEDURE — 1126F AMNT PAIN NOTED NONE PRSNT: CPT | Mod: CPTII,,, | Performed by: NURSE PRACTITIONER

## 2024-01-08 PROCEDURE — 99213 OFFICE O/P EST LOW 20 MIN: CPT | Mod: S$PBB,,, | Performed by: NURSE PRACTITIONER

## 2024-01-08 PROCEDURE — 3288F FALL RISK ASSESSMENT DOCD: CPT | Mod: CPTII,,, | Performed by: NURSE PRACTITIONER

## 2024-01-08 PROCEDURE — 3008F BODY MASS INDEX DOCD: CPT | Mod: CPTII,,, | Performed by: NURSE PRACTITIONER

## 2024-01-08 PROCEDURE — 1160F RVW MEDS BY RX/DR IN RCRD: CPT | Mod: CPTII,,, | Performed by: NURSE PRACTITIONER

## 2024-01-08 PROCEDURE — 1101F PT FALLS ASSESS-DOCD LE1/YR: CPT | Mod: CPTII,,, | Performed by: NURSE PRACTITIONER

## 2024-01-08 PROCEDURE — 99214 OFFICE O/P EST MOD 30 MIN: CPT | Mod: PBBFAC | Performed by: NURSE PRACTITIONER

## 2024-01-08 PROCEDURE — 3079F DIAST BP 80-89 MM HG: CPT | Mod: CPTII,,, | Performed by: NURSE PRACTITIONER

## 2024-01-08 PROCEDURE — 85652 RBC SED RATE AUTOMATED: CPT | Performed by: NURSE PRACTITIONER

## 2024-01-08 PROCEDURE — 36415 COLL VENOUS BLD VENIPUNCTURE: CPT | Performed by: NURSE PRACTITIONER

## 2024-01-08 PROCEDURE — 3074F SYST BP LT 130 MM HG: CPT | Mod: CPTII,,, | Performed by: NURSE PRACTITIONER

## 2024-01-08 PROCEDURE — 1159F MED LIST DOCD IN RCRD: CPT | Mod: CPTII,,, | Performed by: NURSE PRACTITIONER

## 2024-01-08 PROCEDURE — 85025 COMPLETE CBC W/AUTO DIFF WBC: CPT | Performed by: NURSE PRACTITIONER

## 2024-01-08 PROCEDURE — 86140 C-REACTIVE PROTEIN: CPT | Performed by: NURSE PRACTITIONER

## 2024-01-08 PROCEDURE — 86039 ANTINUCLEAR ANTIBODIES (ANA): CPT | Performed by: NURSE PRACTITIONER

## 2024-01-08 PROCEDURE — 83516 IMMUNOASSAY NONANTIBODY: CPT | Performed by: NURSE PRACTITIONER

## 2024-01-08 RX ORDER — DOXYCYCLINE 100 MG/1
100 CAPSULE ORAL EVERY 12 HOURS
Qty: 60 CAPSULE | Refills: 4 | Status: SHIPPED | OUTPATIENT
Start: 2024-01-08

## 2024-01-08 NOTE — PROGRESS NOTES
Patient ID: Maryellen Velez 68 y.o.     Chief Complaint:   Chief Complaint   Patient presents with    Follow-up     osteo        HPI:    Ms. Velez is a 69 y/o WF here for follow up of left arm osteomyelitis. Hx of past ulnar fx  with MRSA positive culture 2016 and bone bx culture + for MRSA 18. Pt completed 6 weeks of Daptomycin 6mg/kg IV daily 10/26/18.  She as then started on Doxycycline 100mg 1 po BID lifelong. Pt is tolerating the meds well without any complaints. Denies fever, chills, headaches, visual problems, N/V/D, SOB, cough, abdominal pain, drainage/erythema/warmth from the healed surgical site. Complains of lower back pain and right shoulder pain that were both present last week. She states she used some Voltaren cream and the pain has since resolved.  Reviewed labs from 24 Sed rate 30 and CRP 2.50.  Pt is due for a COVID and flu vaccine. However, she is not amenable.  PCP is Dr. Elias in [place]. Cervical pap was done by Dr. Elias. Colonoscopy  in [place] by Dr. Childs. Mammogram 23 BIRADS 1.          Past Medical History:   Diagnosis Date    COPD (chronic obstructive pulmonary disease)     Hyperlipidemia     Personal history of colonic polyps 2017    Outside Records        Past Surgical History:   Procedure Laterality Date     SECTION      COLONOSCOPY  2017    Outside Records    ELBOW SURGERY Left     FRACTURE SURGERY  May 2016    NECK SURGERY  1986    SHOULDER SURGERY Left     TUBAL LIGATION      WRIST SURGERY Left         Social History     Socioeconomic History    Marital status:    Tobacco Use    Smoking status: Former     Current packs/day: 0.00     Average packs/day: 1 pack/day for 89.6 years (89.6 ttl pk-yrs)     Types: Cigarettes     Start date: 1970     Quit date: 2015     Years since quittin.7    Smokeless tobacco: Never   Substance and Sexual Activity    Alcohol use: Never    Drug use: Never     Types:  "Marijuana    Sexual activity: Not Currently     Partners: Male     Birth control/protection: None        Family History   Problem Relation Age of Onset    No Known Problems Mother     Heart attack Father     Cancer Sister              Hyperlipidemia Sister     Hypertension Sister     Hypertension Brother     Cancer Brother     COPD Brother         Review of patient's allergies indicates:   Allergen Reactions    Shrimp Anaphylaxis    Hydromorphone      Other reaction(s): nausea/vomiting, Other (See Comments)  Pt. States that it cause dizziness      Shellfish containing products      Other reaction(s): hives/can't breathe    Meperidine Nausea Only     Other reaction(s): Memory loss/dyspepsia    Oxycodone-acetaminophen Nausea Only     Other reaction(s): nausea/vomiting  Patient is not truely allergic to medication. Has requested Norcomakes patient nauseous          Immunization History   Administered Date(s) Administered    Hepatitis A / Hepatitis B 2022, 2022, 2023    Tdap 2016        Review of Systems   Constitutional: Negative.    HENT: Negative.     Eyes: Negative.    Respiratory: Negative.     Cardiovascular: Negative.    Gastrointestinal: Negative.    Genitourinary: Negative.    Musculoskeletal: Negative.    Skin: Negative.    Neurological: Negative.    Endo/Heme/Allergies: Negative.    Psychiatric/Behavioral: Negative.     All other systems reviewed and are negative.         Objective:      /80   Pulse 87   Temp 97.9 °F (36.6 °C)   Resp 16   Ht 5' 4" (1.626 m)   Wt 57.5 kg (126 lb 12.8 oz)   LMP  (LMP Unknown)   BMI 21.77 kg/m²      Physical Exam  Vitals reviewed.   Constitutional:       General: She is not in acute distress.     Appearance: Normal appearance.   HENT:      Head: Normocephalic.      Right Ear: External ear normal.      Left Ear: External ear normal.      Nose: Nose normal.      Mouth/Throat:      Mouth: Mucous membranes are moist.      Pharynx: " Oropharynx is clear.   Eyes:      General: No scleral icterus.     Extraocular Movements: Extraocular movements intact.      Conjunctiva/sclera: Conjunctivae normal.      Pupils: Pupils are equal, round, and reactive to light.   Cardiovascular:      Rate and Rhythm: Normal rate and regular rhythm.      Pulses: Normal pulses.      Heart sounds: Normal heart sounds.   Pulmonary:      Effort: Pulmonary effort is normal. No respiratory distress.      Breath sounds: Normal breath sounds.   Abdominal:      General: Bowel sounds are normal. There is no distension.      Palpations: Abdomen is soft. There is no mass.      Tenderness: There is no abdominal tenderness. There is no right CVA tenderness or left CVA tenderness.      Hernia: No hernia is present.   Musculoskeletal:         General: No tenderness or signs of injury. Normal range of motion.      Cervical back: Normal range of motion and neck supple.      Right lower leg: No edema.      Left lower leg: No edema.   Lymphadenopathy:      Cervical: No cervical adenopathy.   Skin:     General: Skin is warm and dry.      Capillary Refill: Capillary refill takes less than 2 seconds.      Findings: No erythema or lesion.   Neurological:      General: No focal deficit present.      Mental Status: She is alert and oriented to person, place, and time. Mental status is at baseline.   Psychiatric:         Mood and Affect: Mood normal.         Behavior: Behavior normal.         Thought Content: Thought content normal.         Judgment: Judgment normal.          Labs:   Lab Results   Component Value Date    WBC 5.31 01/08/2024    HGB 13.5 01/08/2024    HCT 40.2 01/08/2024    MCV 88.4 01/08/2024     01/08/2024       CMP  Sodium Level   Date Value Ref Range Status   01/02/2024 141 136 - 145 mmol/L Final     Potassium Level   Date Value Ref Range Status   01/02/2024 4.1 3.5 - 5.1 mmol/L Final     Carbon Dioxide   Date Value Ref Range Status   01/02/2024 25 23 - 31 mmol/L Final      Blood Urea Nitrogen   Date Value Ref Range Status   01/02/2024 13.2 9.8 - 20.1 mg/dL Final     Creatinine   Date Value Ref Range Status   01/02/2024 0.77 0.55 - 1.02 mg/dL Final     Calcium Level Total   Date Value Ref Range Status   01/02/2024 9.2 8.4 - 10.2 mg/dL Final     Albumin Level   Date Value Ref Range Status   01/02/2024 3.6 3.4 - 4.8 g/dL Final     Bilirubin Total   Date Value Ref Range Status   01/02/2024 0.6 <=1.5 mg/dL Final     Alkaline Phosphatase   Date Value Ref Range Status   01/02/2024 75 40 - 150 unit/L Final     Aspartate Aminotransferase   Date Value Ref Range Status   01/02/2024 16 5 - 34 unit/L Final     Alanine Aminotransferase   Date Value Ref Range Status   01/02/2024 12 0 - 55 unit/L Final     eGFR   Date Value Ref Range Status   01/02/2024 >60 mls/min/1.73/m2 Final     Lab Results   Component Value Date    TSH 1.058 08/26/2023     Hep C Ab Interp   Date Value Ref Range Status   07/21/2022 Nonreactive Nonreactive Final     Cholesterol Total   Date Value Ref Range Status   08/26/2023 203 (H) <=200 mg/dL Final     HDL Cholesterol   Date Value Ref Range Status   08/26/2023 54 35 - 60 mg/dL Final     Triglyceride   Date Value Ref Range Status   08/26/2023 77 37 - 140 mg/dL Final     Cholesterol/HDL Ratio   Date Value Ref Range Status   08/26/2023 4 0 - 5 Final     Very Low Density Lipoprotein   Date Value Ref Range Status   08/26/2023 15  Final     LDL Cholesterol   Date Value Ref Range Status   08/26/2023 134.00 50.00 - 140.00 mg/dL Final     No results found for this or any previous visit.  No results found for this or any previous visit.  No results found for this or any previous visit.  No results found for this or any previous visit.  Results for orders placed or performed in visit on 11/29/17   Urinalysis   Result Value Ref Range    Color, UA Yellow >Yellow    Appearance, UA Clear >Clear    Specific Gravity,UA 1.015 1.000 - 1.032    pH, UA 6.5 5.0 - 9.0    Glucose, UA Negative  >Negative    Protein, UA Negative >Negative    Occult Blood UA Trace-intact     Ketones, UA Negative >Negative    Bilirubin (UA) Negative >Negative    Urobilinogen, UA 0.2     Leukocytes, UA Trace (A) >Negative    RBC, UA 0-2 >0 - 2 /HPF    Squam Epithel, UA Occasional (A) >None-Rare    Nitrites, UA Negative     WBC, UA 2-4 >0 - 2 /HPF    Bacteria, UA Occasional (A) >None Seen /HPF       Imaging: Reviewed most recent relevant imaging studies available, notable results highlighted in this note    Medications:     Current Outpatient Medications   Medication Instructions    acetaminophen (TYLENOL) 500 mg, Oral, Every 6 hours PRN    budesonide-formoterol 160-4.5 mcg (SYMBICORT) 160-4.5 mcg/actuation HFAA Inhalation    doxycycline (MONODOX) 100 mg, Oral, Every 12 hours    pravastatin (PRAVACHOL) 40 MG tablet No dose, route, or frequency recorded.    tiotropium bromide (SPIRIVA RESPIMAT) 1.25 mcg/actuation inhaler Inhalation       Assessment:       Problem List Items Addressed This Visit    None  Visit Diagnoses       Chronic osteomyelitis involving forearm, left    -  Primary    Relevant Medications    doxycycline (MONODOX) 100 MG capsule    Other Relevant Orders    CBC Auto Differential (Completed)    C-Reactive Protein    Sedimentation rate    SALAS IgG by IFA    Rheumatoid Factors, IgA, IgG, IgM    Chronic antibiotic suppression        Relevant Medications    doxycycline (MONODOX) 100 MG capsule               Plan:      Chronic osteomyelitis involving forearm, left  -     doxycycline (MONODOX) 100 MG capsule; Take 1 capsule (100 mg total) by mouth every 12 (twelve) hours.  Dispense: 60 capsule; Refill: 4  -     CBC Auto Differential; Future; Expected date: 01/08/2024  -     C-Reactive Protein; Future; Expected date: 01/08/2024  -     Sedimentation rate; Future; Expected date: 01/08/2024  -     SALAS IgG by IFA; Future; Expected date: 01/08/2024  -     Rheumatoid Factors, IgA, IgG, IgM; Future; Expected date:  01/08/2024  Continue Doxycycline 100 mg 1 po BID   RTC 4 months with Gaby for in office visit  Repeat labs today    COVID and flu precautions discussed  Vaccines recommended. Pt is not amenable to vaccines    Chronic antibiotic suppression  -     doxycycline (MONODOX) 100 MG capsule; Take 1 capsule (100 mg total) by mouth every 12 (twelve) hours.  Dispense: 60 capsule; Refill: 4  Continue Doxycycline 100 mg 1 po BID   Risk vs benefits discussed with patient along with a/e's. Pt is to remain upright for at lease 30 minutes to prevent pill esophagitis and will need sun protection d/t photosensitivity with medication (pt will need to avoid direct sun exposure, wear sunscreen, wear sunglasses, wear long sleeves/skin protection, etc).  Pt verbalized understanding and agrees with plan.

## 2024-01-10 LAB
ANA PAT SER IF-IMP: ABNORMAL
ANA SER QL HEP2 SUBST: ABNORMAL
ANA TITR SER HEP2 SUBST: ABNORMAL {TITER}

## 2024-01-11 ENCOUNTER — TELEPHONE (OUTPATIENT)
Dept: INFECTIOUS DISEASES | Facility: CLINIC | Age: 69
End: 2024-01-11
Payer: MEDICARE

## 2024-01-11 DIAGNOSIS — R76.8 POSITIVE ANA (ANTINUCLEAR ANTIBODY): Primary | ICD-10-CM

## 2024-01-11 LAB
RF IGA SER-ACNC: <5 UNITS
RF IGG SER-ACNC: 5 UNITS
RF IGM SER-ACNC: <5 UNITS

## 2024-01-11 NOTE — TELEPHONE ENCOUNTER
----- Message from ELIAS Castillo sent at 1/11/2024  8:47 AM CST -----  Please let Mrs. Bojorquez know that her SALAS test is coming back positive. She will need a further workup with rheumatology so I will send a referral.

## 2024-01-11 NOTE — PROGRESS NOTES
Please let Mrs. Bojorquez know that her SALAS test is coming back positive. She will need a further workup with rheumatology so I will send a referral.

## 2024-01-17 ENCOUNTER — HOSPITAL ENCOUNTER (EMERGENCY)
Facility: HOSPITAL | Age: 69
Discharge: HOME | End: 2024-01-17
Attending: EMERGENCY MEDICINE
Payer: MEDICARE

## 2024-01-17 VITALS
RESPIRATION RATE: 18 BRPM | HEIGHT: 65 IN | TEMPERATURE: 98 F | BODY MASS INDEX: 20.49 KG/M2 | OXYGEN SATURATION: 96 % | DIASTOLIC BLOOD PRESSURE: 72 MMHG | SYSTOLIC BLOOD PRESSURE: 152 MMHG | HEART RATE: 93 BPM | WEIGHT: 123 LBS

## 2024-01-17 DIAGNOSIS — S52.501A CLOSED FRACTURE OF DISTAL ENDS OF RIGHT RADIUS AND ULNA, INITIAL ENCOUNTER: Primary | ICD-10-CM
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
## 2024-01-17 DIAGNOSIS — S52.601A CLOSED FRACTURE OF DISTAL ENDS OF RIGHT RADIUS AND ULNA, INITIAL ENCOUNTER: Primary | ICD-10-CM

## 2024-01-17 PROCEDURE — 96374 THER/PROPH/DIAG INJ IV PUSH: CPT | Mod: 59

## 2024-01-17 PROCEDURE — 63600175 PHARM REV CODE 636 W HCPCS: Mod: JZ,JG | Performed by: PHYSICIAN ASSISTANT

## 2024-01-17 PROCEDURE — 63600175 PHARM REV CODE 636 W HCPCS: Performed by: EMERGENCY MEDICINE

## 2024-01-17 PROCEDURE — 96375 TX/PRO/DX INJ NEW DRUG ADDON: CPT | Mod: 59

## 2024-01-17 PROCEDURE — 96376 TX/PRO/DX INJ SAME DRUG ADON: CPT | Mod: 59

## 2024-01-17 PROCEDURE — 99900035 HC TECH TIME PER 15 MIN (STAT)

## 2024-01-17 PROCEDURE — 25605 CLTX DST RDL FX/EPHYS SEP W/: CPT | Mod: RT

## 2024-01-17 PROCEDURE — 99285 EMERGENCY DEPT VISIT HI MDM: CPT | Mod: 25

## 2024-01-17 RX ORDER — ONDANSETRON 4 MG/1
4 TABLET, ORALLY DISINTEGRATING ORAL EVERY 6 HOURS PRN
Qty: 12 TABLET | Refills: 0 | Status: SHIPPED | OUTPATIENT
Start: 2024-01-17 | End: 2024-03-13

## 2024-01-17 RX ORDER — PROMETHAZINE HYDROCHLORIDE 12.5 MG/1
12.5 TABLET ORAL EVERY 6 HOURS PRN
Qty: 12 TABLET | Refills: 0 | Status: SHIPPED | OUTPATIENT
Start: 2024-01-17 | End: 2024-03-13

## 2024-01-17 RX ORDER — ONDANSETRON HYDROCHLORIDE 2 MG/ML
4 INJECTION, SOLUTION INTRAVENOUS
Status: COMPLETED | OUTPATIENT
Start: 2024-01-17 | End: 2024-01-17

## 2024-01-17 RX ORDER — MORPHINE SULFATE 4 MG/ML
1 INJECTION, SOLUTION INTRAMUSCULAR; INTRAVENOUS
Status: COMPLETED | OUTPATIENT
Start: 2024-01-17 | End: 2024-01-17

## 2024-01-17 RX ORDER — ONDANSETRON HYDROCHLORIDE 2 MG/ML
INJECTION, SOLUTION INTRAVENOUS
Status: DISCONTINUED
Start: 2024-01-17 | End: 2024-01-17 | Stop reason: HOSPADM

## 2024-01-17 RX ORDER — HYDROCODONE BITARTRATE AND ACETAMINOPHEN 5; 325 MG/1; MG/1
1 TABLET ORAL EVERY 6 HOURS PRN
Qty: 12 TABLET | Refills: 0 | Status: ON HOLD | OUTPATIENT
Start: 2024-01-17 | End: 2024-01-26 | Stop reason: HOSPADM

## 2024-01-17 RX ORDER — KETOROLAC TROMETHAMINE 30 MG/ML
15 INJECTION, SOLUTION INTRAMUSCULAR; INTRAVENOUS
Status: COMPLETED | OUTPATIENT
Start: 2024-01-17 | End: 2024-01-17

## 2024-01-17 RX ORDER — PROPOFOL 10 MG/ML
110 VIAL (ML) INTRAVENOUS ONCE
Status: COMPLETED | OUTPATIENT
Start: 2024-01-17 | End: 2024-01-17

## 2024-01-17 RX ADMIN — ONDANSETRON 4 MG: 2 INJECTION INTRAMUSCULAR; INTRAVENOUS at 01:01

## 2024-01-17 RX ADMIN — PROPOFOL 80 MG: 10 INJECTION, EMULSION INTRAVENOUS at 02:01

## 2024-01-17 RX ADMIN — MORPHINE SULFATE 1 MG: 4 INJECTION, SOLUTION INTRAMUSCULAR; INTRAVENOUS at 01:01

## 2024-01-17 RX ADMIN — ONDANSETRON 4 MG: 2 INJECTION INTRAMUSCULAR; INTRAVENOUS at 03:01

## 2024-01-17 RX ADMIN — KETOROLAC TROMETHAMINE 15 MG: 30 INJECTION, SOLUTION INTRAMUSCULAR at 02:01

## 2024-01-17 RX ADMIN — MORPHINE SULFATE 1 MG: 4 INJECTION, SOLUTION INTRAMUSCULAR; INTRAVENOUS at 02:01

## 2024-01-17 NOTE — ED PROVIDER NOTES
Encounter Date: 2024       History     Chief Complaint   Patient presents with    Fall     Fell. C/o R wrist pain     68-year-old female with a history of COPD, hyperlipidemia and history of chronic osteomyelitis of the left forearm presents to the ED brought in by daughter for evaluation of right wrist pain secondary to slip and fall onset prior to arrival.  Patient states she was walking down some steps when she slipped and tried to use her right hand to break her fall.  Denies hitting her head or LOC.  No medications taken prior to arrival.    The history is provided by the patient. No  was used.     Review of patient's allergies indicates:   Allergen Reactions    Shrimp Anaphylaxis    Hydromorphone      Other reaction(s): nausea/vomiting, Other (See Comments)  Pt. States that it cause dizziness      Shellfish containing products      Other reaction(s): hives/can't breathe    Meperidine Nausea Only     Other reaction(s): Memory loss/dyspepsia    Oxycodone-acetaminophen Nausea Only     Other reaction(s): nausea/vomiting  Patient is not truely allergic to medication. Has requested Norcomakes patient nauseous       Past Medical History:   Diagnosis Date    COPD (chronic obstructive pulmonary disease)     Hyperlipidemia     Personal history of colonic polyps 2017    Outside Records     Past Surgical History:   Procedure Laterality Date     SECTION      COLONOSCOPY  2017    Outside Records    ELBOW SURGERY Left     FRACTURE SURGERY  May 2016    NECK SURGERY      SHOULDER SURGERY Left     TUBAL LIGATION      WRIST SURGERY Left      Family History   Problem Relation Age of Onset    No Known Problems Mother     Heart attack Father     Cancer Sister              Hyperlipidemia Sister     Hypertension Sister     Hypertension Brother     Cancer Brother     COPD Brother      Social History     Tobacco Use    Smoking status: Former     Current packs/day: 0.00      Average packs/day: 1 pack/day for 89.6 years (89.6 ttl pk-yrs)     Types: Cigarettes     Start date: 1970     Quit date: 2015     Years since quittin.8    Smokeless tobacco: Never   Substance Use Topics    Alcohol use: Never    Drug use: Never     Types: Marijuana     Review of Systems   Constitutional:  Negative for chills and fever.   Eyes:  Negative for visual disturbance.   Respiratory:  Negative for cough and shortness of breath.    Cardiovascular:  Negative for chest pain.   Gastrointestinal:  Negative for abdominal pain, nausea and vomiting.   Genitourinary:  Negative for dysuria.   Musculoskeletal:  Positive for arthralgias.   Skin:  Negative for color change and rash.   Neurological:  Negative for dizziness and headaches.   Psychiatric/Behavioral:  Negative for behavioral problems.    All other systems reviewed and are negative.      Physical Exam     Initial Vitals [24 1243]   BP Pulse Resp Temp SpO2   (!) 154/102 (!) 115 18 98.4 °F (36.9 °C) 99 %      MAP       --         Physical Exam    Nursing note and vitals reviewed.  Constitutional: She appears well-developed and well-nourished.   HENT:   Head: Normocephalic and atraumatic.   Eyes: EOM are normal. Pupils are equal, round, and reactive to light.   Neck: Neck supple.   Cardiovascular:  Normal rate, regular rhythm, normal heart sounds and intact distal pulses.           Pulmonary/Chest: Breath sounds normal.   Abdominal: Abdomen is soft. Bowel sounds are normal.   Musculoskeletal:      Right wrist: Deformity, tenderness and bony tenderness present. No swelling, effusion, lacerations, snuff box tenderness or crepitus. Decreased range of motion. Normal pulse.      Right hand: Normal strength. Normal sensation. There is no disruption of two-point discrimination. Normal capillary refill. Normal pulse.      Cervical back: Neck supple.      Comments: Patient holding right arm against side for comfort     Neurological: She is alert and  oriented to person, place, and time. She has normal strength. GCS score is 15. GCS eye subscore is 4. GCS verbal subscore is 5. GCS motor subscore is 6.   Skin: Skin is warm and dry. Capillary refill takes less than 2 seconds.   Psychiatric: She has a normal mood and affect.         ED Course   Orthopedic Injury    Date/Time: 1/17/2024 2:42 PM    Performed by: Rhea Louise MD  Authorized by: Rhea Louise MD    Location procedure was performed:  [facility] EMERGENCY DEPARTMENT  Pre-operative diagnosis:  Distal radius and ulna fracture, displaced  Post-operative diagnosis:  Distal radius and fracture, reduced  Consent Done?:  Yes  Universal Protocol:     Written consent obtained?: Yes      Risks and benefits: Risks, benefits and alternatives were discussed      Consent given by:  Patient    Patient states understanding of procedure being performed: Yes      Patient identity confirmed:  Verbally with patient and name    Time Out: Immediately prior to the procedure a time out was called    Injury:     Injury location:  Wrist    Injury type:  Fracture    Fracture type: distal radius and ulnar styloid      Fracture type: distal radius and ulnar styloid        Pre-procedure assessment:     Neurovascular status: Neurovascularly intact      Distal perfusion: normal      Neurological function: normal      Range of motion: reduced      Local anesthesia used?: No      Patient sedated?: Yes      ASA Class:  Class 3 - Systemic Illness with functional impairment.    Mallampati Score:  Class 1 - Visualization of the soft palate, fauces, uvula, and anterior/posterior pillars.  Date/Time of last solid:  1/17/2024 9:00 AM    Contents of Last Food Intake:  Peanut butter cake    Date/Time of last fluid:  1/17/2024 9:00 AM    Contents of Last Fluid Intake:  Coffee    Patient/Family history of anesthesia or sedation complications: No      Sedation type: moderate (conscious) sedation      Sedation:  Propofol    Analgesia:   Morphine    Sedation start:  1/17/2024 2:30 PM    Sedation end:  1/17/2024 2:40 PM    Vital signs: Vital signs monitored during sedation        Selections made in this section will also lock the Injury type section above.:     Immobilization:  Splint    Splint type: Sugar-tong.    Supplies used:  Ortho-Glass    Technical Procedures Used:  Traction, pronation, supination    Complications: No      Estimated blood loss (mL):  0  Post-procedure assessment:     Neurovascular status: Neurovascularly intact      Distal perfusion: normal      Neurological function: normal      Range of motion: splinted      Patient tolerance:  Patient tolerated the procedure well with no immediate complications  Splint Application    Date/Time: 1/17/2024 10:06 PM    Performed by: Rhea Louise MD  Authorized by: Rhea Louise MD  Consent Done: Yes  Consent: Verbal consent obtained.  Risks and benefits: risks, benefits and alternatives were discussed  Consent given by: patient  Patient understanding: patient states understanding of the procedure being performed  Imaging studies: imaging studies available  Patient identity confirmed: verbally with patient  Location details: right arm  Splint type: sugar tong  Supplies used: Ortho-Glass  Post-procedure: The splinted body part was neurovascularly unchanged following the procedure.  Patient tolerance: Patient tolerated the procedure well with no immediate complications        Labs Reviewed - No data to display       Imaging Results              X-Ray Wrist 2 View Right (Final result)  Result time 01/17/24 15:42:27   Procedure changed from X-Ray Wrist Complete Right     Final result by Los Zimmerman MD (01/17/24 15:42:27)                   Impression:      Improved osseous alignment.      Electronically signed by: Los Zimmerman  Date:    01/17/2024  Time:    15:42               Narrative:    EXAMINATION:  XR WRIST 2 VIEW RIGHT    CLINICAL HISTORY:  post reduction; Unspecified  fall, initial encounter    TECHNIQUE:  PA and lateral views of the right wrist    COMPARISON:  Radiography earlier today    FINDINGS:  Improved overall alignment of the distal radial and ulnar fractures with minimal residual displacement.                                        X-Ray Wrist Complete Right (Final result)  Result time 01/17/24 13:28:53      Final result by Alexis Castro MD (01/17/24 13:28:53)                   Impression:      Demineralization of the visualized osseous structures more than expected for patient's age.    Fractures as above.    Degenerative changes.    This report was flagged in Epic as abnormal..      Electronically signed by: Alexis Castro  Date:    01/17/2024  Time:    13:28               Narrative:    EXAMINATION:  XR WRIST COMPLETE 3 VIEWS RIGHT    CLINICAL HISTORY:  Unspecified fall, initial encounter    COMPARISON:  None.    FINDINGS:  There is demineralization of the visualized osseous structures    There is a comminuted displaced fracture involving the distal radius with what appears to be a triangular type fragment that might be related to fracture of the ulnar styloid    There are degenerative changes of the carpal rows as well as degenerative changes of the 1st carpometacarpal joint    No blastic or lytic lesions.    Soft tissues within normal limits.                                       Medications   morphine injection 1 mg (1 mg Intravenous Given 1/17/24 1329)   ondansetron injection 4 mg (4 mg Intravenous Given 1/17/24 1328)   propofol (DIPRIVAN) 10 mg/mL IVP (80 mg Intravenous Given by Provider 1/17/24 1433)   morphine injection 1 mg (1 mg Intravenous Given 1/17/24 1448)   ketorolac injection 15 mg (15 mg Intravenous Given 1/17/24 1449)   ondansetron injection 4 mg (4 mg Intravenous Given 1/17/24 1531)     Medical Decision Making  Differential diagnosis:  Fracture, sprain, dislocation, fall    68-year-old female with a history of COPD, hyperlipidemia and history  of chronic osteomyelitis of the left forearm presents to the ED brought in by daughter for evaluation of right wrist pain secondary to slip and fall onset prior to arrival.  Patient states she was walking down some steps when she slipped and tried to use her right hand to break her fall.  Denies hitting her head or LOC.  No medications taken prior to arrival.      Amount and/or Complexity of Data Reviewed  Radiology: ordered. Decision-making details documented in ED Course.  Discussion of management or test interpretation with external provider(s): Nano is Dr. Rhea Louise and I assumed care of Ms Velez shortly after arrival, performed my own independent history and physical exam, performed a substantive amount of the care, performed the fracture reduction and orthopedic consultation, and assumed responsibility for her care and plan. She's a 68-year-old female, who fell on an outstretched hand, andHas severe wrist pain.  Sshe had a right wrist, distal radius and styloid fracture. Conscious sedation was performed and the fractures were reduced. Dr Monroe was consulted and will follow the patient up next week.    Risk  Prescription drug management.               ED Course as of 01/17/24 2207   Wed Jan 17, 2024   1306 Patient very cocnerned about getting pain medication due to history of associated n/v in the past; agreed on 1mg of morphine with nausea medication [MA]   1452 X-Ray Wrist Complete Right [SH]      ED Course User Index  [MA] Ray Johnson, KARLA  [SH] Rhea Louise MD                           Clinical Impression:  Final diagnoses:  [W19.XXXA] Fall  [S52.501A, S52.601A] Closed fracture of distal ends of right radius and ulna, initial encounter (Primary)          ED Disposition Condition    Discharge Stable          ED Prescriptions       Medication Sig Dispense Start Date End Date Auth. Provider    HYDROcodone-acetaminophen (NORCO) 5-325 mg per tablet Take 1 tablet by mouth every 6 (six)  hours as needed for Pain. 12 tablet 1/17/2024 -- Rhea Louise MD    ondansetron (ZOFRAN-ODT) 4 MG TbDL Take 1 tablet (4 mg total) by mouth every 6 (six) hours as needed (nausea). 12 tablet 1/17/2024 -- Rhea Louise MD    promethazine (PHENERGAN) 12.5 MG Tab Take 1 tablet (12.5 mg total) by mouth every 6 (six) hours as needed (nausea not relieved by zofran). 12 tablet 1/17/2024 -- Rhea Louise MD          Follow-up Information       Follow up With Specialties Details Why Contact Info    Bunny Monroe MD Orthopedic Surgery Schedule an appointment as soon as possible for a visit   [address] [place] [id number] [phone]               Rhea Louise MD  01/17/24 5890

## 2024-01-25 ENCOUNTER — OFFICE VISIT (OUTPATIENT)
Dept: ORTHOPEDICS | Facility: CLINIC | Age: 69
End: 2024-01-25
Payer: MEDICARE

## 2024-01-25 VITALS
SYSTOLIC BLOOD PRESSURE: 185 MMHG | DIASTOLIC BLOOD PRESSURE: 101 MMHG | HEART RATE: 104 BPM | WEIGHT: 123 LBS | BODY MASS INDEX: 20.49 KG/M2 | HEIGHT: 65 IN

## 2024-01-25 DIAGNOSIS — S52.571A OTHER CLOSED INTRA-ARTICULAR FRACTURE OF DISTAL END OF RIGHT RADIUS, INITIAL ENCOUNTER: ICD-10-CM

## 2024-01-25 DIAGNOSIS — S52.571A OTHER INTRAARTICULAR FRACTURE OF LOWER END OF RIGHT RADIUS, INITIAL ENCOUNTER FOR CLOSED FRACTURE: Primary | ICD-10-CM

## 2024-01-25 PROCEDURE — 1160F RVW MEDS BY RX/DR IN RCRD: CPT | Mod: CPTII,,, | Performed by: ORTHOPAEDIC SURGERY

## 2024-01-25 PROCEDURE — 3008F BODY MASS INDEX DOCD: CPT | Mod: CPTII,,, | Performed by: ORTHOPAEDIC SURGERY

## 2024-01-25 PROCEDURE — 3077F SYST BP >= 140 MM HG: CPT | Mod: CPTII,,, | Performed by: ORTHOPAEDIC SURGERY

## 2024-01-25 PROCEDURE — 1159F MED LIST DOCD IN RCRD: CPT | Mod: CPTII,,, | Performed by: ORTHOPAEDIC SURGERY

## 2024-01-25 PROCEDURE — 3080F DIAST BP >= 90 MM HG: CPT | Mod: CPTII,,, | Performed by: ORTHOPAEDIC SURGERY

## 2024-01-25 PROCEDURE — 99204 OFFICE O/P NEW MOD 45 MIN: CPT | Mod: 57,,, | Performed by: ORTHOPAEDIC SURGERY

## 2024-01-25 RX ORDER — MUPIROCIN 20 MG/G
OINTMENT TOPICAL
Status: CANCELLED | OUTPATIENT
Start: 2024-01-25

## 2024-01-25 NOTE — PROGRESS NOTES
Subjective:       Patient ID: Maryellen Velez is a 68 y.o. female.    Chief Complaint   Patient presents with    Right Wrist - Injury     Rt wrist 1/17/24 ER on same day. Radius/ulna Patient is here today in splint from ER. She is not in pain today.          Patient is here today for initial evaluation of injury sustained to her right wrist in a fall.  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  She has been treated for osteomyelitis of the left upper extremity.  She is currently got no signs or symptoms of infection is on doxycycline for suppressive antibiotic therapy.  Recent lab work showed that her ESR was within normal limits.    She was seen in the emergency department last week after a fall and underwent manipulation of her right distal radius fracture in his currently in a sugar-tong splint.  She has no complaints of any pain in the wrist today.  She is here today with her daughter to discuss operative stabilization.    Injury  Pertinent negatives include no abdominal pain, chest pain, chills, congestion, coughing, fever, nausea, neck pain, numbness or vomiting.       Review of Systems   Constitutional: Negative for chills, fever and malaise/fatigue.   HENT:  Negative for congestion and hearing loss.    Eyes:  Negative for visual disturbance.   Cardiovascular:  Negative for chest pain and syncope.   Respiratory:  Negative for cough and shortness of breath.    Hematologic/Lymphatic: Does not bruise/bleed easily.   Skin:  Negative for color change and suspicious lesions.   Musculoskeletal:  Negative for falls and neck pain.   Gastrointestinal:  Negative for abdominal pain, nausea and vomiting.   Genitourinary:  Negative for dysuria and hematuria.   Neurological:  Negative for numbness and sensory change.  "  Psychiatric/Behavioral:  Negative for altered mental status. The patient is not nervous/anxious.         Current Outpatient Medications on File Prior to Visit   Medication Sig Dispense Refill    acetaminophen (TYLENOL) 500 MG tablet Take 500 mg by mouth every 6 (six) hours as needed.      budesonide-formoterol 160-4.5 mcg (SYMBICORT) 160-4.5 mcg/actuation HFAA Inhale into the lungs.      doxycycline (MONODOX) 100 MG capsule Take 1 capsule (100 mg total) by mouth every 12 (twelve) hours. 60 capsule 4    HYDROcodone-acetaminophen (NORCO) 5-325 mg per tablet Take 1 tablet by mouth every 6 (six) hours as needed for Pain. 12 tablet 0    ondansetron (ZOFRAN-ODT) 4 MG TbDL Take 1 tablet (4 mg total) by mouth every 6 (six) hours as needed (nausea). 12 tablet 0    promethazine (PHENERGAN) 12.5 MG Tab Take 1 tablet (12.5 mg total) by mouth every 6 (six) hours as needed (nausea not relieved by zofran). 12 tablet 0    tiotropium bromide (SPIRIVA RESPIMAT) 1.25 mcg/actuation inhaler Inhale into the lungs.      pravastatin (PRAVACHOL) 40 MG tablet        No current facility-administered medications on file prior to visit.          Objective:      BP (!) 185/101 (BP Location: Left leg, Patient Position: Sitting, BP Method: Large (Automatic))   Pulse 104   Ht 5' 5" (1.651 m)   Wt 55.8 kg (123 lb)   LMP  (LMP Unknown)   BMI 20.47 kg/m²   Physical Exam  Constitutional:       General: She is not in acute distress.     Appearance: Normal appearance. She is not ill-appearing.   HENT:      Head: Normocephalic and atraumatic.      Nose: No congestion.   Eyes:      Extraocular Movements: Extraocular movements intact.   Cardiovascular:      Rate and Rhythm: Normal rate and regular rhythm.      Pulses: Normal pulses.   Pulmonary:      Effort: Pulmonary effort is normal.      Breath sounds: Normal breath sounds.   Abdominal:      General: There is no distension.      Palpations: Abdomen is soft.      Tenderness: There is no abdominal " tenderness.   Musculoskeletal:      Comments: Right upper extremity:  Sugar-tong splint in place.  Well padded.  Intact EPL/FPL, EDC/FDP and interossei.  Her sensation light touch is intact distally.  She is brisk capillary refill to her digits.  No evidence of any median nerve compression.   Skin:     General: Skin is warm and dry.   Neurological:      Mental Status: She is alert and oriented to person, place, and time. Mental status is at baseline.   Psychiatric:         Mood and Affect: Mood normal.         Behavior: Behavior normal.         Thought Content: Thought content normal.         Judgment: Judgment normal.        Body mass index is 20.47 kg/m².    Radiology:   X-rays of the right wrist, three views from the emergency department:  Displaced intra-articular fracture of the right distal radius with a nondisplaced ulnar styloid fracture.      Assessment:         1. Other intraarticular fracture of lower end of right radius, initial encounter for closed fracture  Place in Outpatient    Case Request Operating Room: ORIF, FRACTURE, RADIUS, DISTAL, RIGHT    Vital signs    Insert peripheral IV    Verify informed consent    Diet NPO    Place JACOB hose    Place sequential compression device    Full code    Insert peripheral IV    Verify informed consent    Diet NPO    Place JACOB hose    Place sequential compression device      2. Other closed intra-articular fracture of distal end of right radius, initial encounter                Plan:       The risks, benefits and alternatives treatment were discussed at length with the patient today including but not limited to pain, bleeding, scarring, infection, damage to neurovascular structures, malunion/nonunion, hardware failure/irritation, need for future procedures and complications leading to amputation and even death.  She will benefit from operative stabilization of her right distal radius fracture.  I will plan to take her to the operating room tomorrow for open  reduction and internal fixation.  She will need to remain nonweightbearing to the right upper extremity.  Keep splint clean and dry.  Keep limb elevated while at rest.  She and her daughter understand and agree with all that we have discussed and all questions and concerns were addressed today.    This note/OR report was created with the assistance of  voice recognition software or phone  dictation.  There may be transcription errors as a result of using this technology however minimal. Effort has been made to assure accuracy of transcription but any obvious errors or omissions should be clarified with the author of the document.         Bunny Monroe MD  Orthopedic Trauma  [facility]      No follow-ups on file.    Other intraarticular fracture of lower end of right radius, initial encounter for closed fracture  -     Place in Outpatient; Standing  -     Case Request Operating Room: ORIF, FRACTURE, RADIUS, DISTAL, RIGHT  -     Vital signs; Standing  -     Insert peripheral IV; Standing  -     Verify informed consent; Standing  -     Diet NPO; Standing  -     Place JACOB hose; Standing  -     Place sequential compression device; Standing  -     Full code; Standing    Other closed intra-articular fracture of distal end of right radius, initial encounter    Other orders  -     ceFAZolin (ANCEF) 2 g in dextrose 5 % (D5W) 50 mL IVPB  -     mupirocin 2 % ointment              Orders Placed This Encounter   Procedures    Diet NPO     Specify start time     Standing Status:   Standing     Number of Occurrences:   1    Verify informed consent     Standing Status:   Standing     Number of Occurrences:   1    Place JACOB hose     Standing Status:   Standing     Number of Occurrences:   1    Place sequential compression device     Standing Status:   Standing     Number of Occurrences:   1    Insert peripheral IV     Standing Status:   Standing     Number of Occurrences:   1    Case Request Operating Room: ORIF,  FRACTURE, RADIUS, DISTAL, RIGHT     Order Specific Question:   Medical Necessity:     Answer:   Medically Urgent [101]     Order Specific Question:   CPT Code:     Answer:   UT OPEN RX DISTAL RADIUS FX, INTRA-ARTICULAR, 3+ FRAG [73146]     Order Specific Question:   Is an on-site pathologist required for this procedure?     Answer:   N/A       Future Appointments   Date Time Provider Department Center   1/25/2024  2:45 PM Bunny Monroe MD [facility] [place]   5/9/2024  8:30 AM Gaby Zimmerman FNP [facility] [place]

## 2024-01-26 ENCOUNTER — ANESTHESIA EVENT (OUTPATIENT)
Dept: SURGERY | Facility: HOSPITAL | Age: 69
End: 2024-01-26
Payer: MEDICARE

## 2024-01-26 ENCOUNTER — HOSPITAL ENCOUNTER (OUTPATIENT)
Facility: HOSPITAL | Age: 69
Discharge: HOME | End: 2024-01-26
Attending: ORTHOPAEDIC SURGERY | Admitting: ORTHOPAEDIC SURGERY
Payer: MEDICARE

## 2024-01-26 ENCOUNTER — ANESTHESIA (OUTPATIENT)
Dept: SURGERY | Facility: HOSPITAL | Age: 69
End: 2024-01-26
Payer: MEDICARE

## 2024-01-26 DIAGNOSIS — S52.571A OTHER INTRAARTICULAR FRACTURE OF LOWER END OF RIGHT RADIUS, INITIAL ENCOUNTER FOR CLOSED FRACTURE: ICD-10-CM

## 2024-01-26 DIAGNOSIS — S52.571A OTHER CLOSED INTRA-ARTICULAR FRACTURE OF DISTAL END OF RIGHT RADIUS, INITIAL ENCOUNTER: ICD-10-CM

## 2024-01-26 PROCEDURE — 71000016 HC POSTOP RECOV ADDL HR: Performed by: ORTHOPAEDIC SURGERY

## 2024-01-26 PROCEDURE — 37000009 HC ANESTHESIA EA ADD 15 MINS: Performed by: ORTHOPAEDIC SURGERY

## 2024-01-26 PROCEDURE — 63600175 PHARM REV CODE 636 W HCPCS: Performed by: ANESTHESIOLOGY

## 2024-01-26 PROCEDURE — 25609 OPTX DST RD XART FX/EP SEP3+: CPT | Mod: AS,RT,, | Performed by: PHYSICIAN ASSISTANT

## 2024-01-26 PROCEDURE — 63600175 PHARM REV CODE 636 W HCPCS: Performed by: ORTHOPAEDIC SURGERY

## 2024-01-26 PROCEDURE — 25000003 PHARM REV CODE 250: Performed by: ORTHOPAEDIC SURGERY

## 2024-01-26 PROCEDURE — 71000015 HC POSTOP RECOV 1ST HR: Performed by: ORTHOPAEDIC SURGERY

## 2024-01-26 PROCEDURE — 25000003 PHARM REV CODE 250: Performed by: NURSE ANESTHETIST, CERTIFIED REGISTERED

## 2024-01-26 PROCEDURE — 37000008 HC ANESTHESIA 1ST 15 MINUTES: Performed by: ORTHOPAEDIC SURGERY

## 2024-01-26 PROCEDURE — 36000710: Performed by: ORTHOPAEDIC SURGERY

## 2024-01-26 PROCEDURE — 36000711: Performed by: ORTHOPAEDIC SURGERY

## 2024-01-26 PROCEDURE — 64417 NJX AA&/STRD AX NERVE IMG: CPT | Mod: 59,RT | Performed by: ANESTHESIOLOGY

## 2024-01-26 PROCEDURE — 25609 OPTX DST RD XART FX/EP SEP3+: CPT | Mod: RT,,, | Performed by: ORTHOPAEDIC SURGERY

## 2024-01-26 PROCEDURE — D9220A PRA ANESTHESIA: Mod: CRNA,,, | Performed by: NURSE ANESTHETIST, CERTIFIED REGISTERED

## 2024-01-26 PROCEDURE — 63600175 PHARM REV CODE 636 W HCPCS: Performed by: NURSE ANESTHETIST, CERTIFIED REGISTERED

## 2024-01-26 PROCEDURE — 64415 NJX AA&/STRD BRCH PLXS IMG: CPT | Mod: 59,RT,, | Performed by: ANESTHESIOLOGY

## 2024-01-26 PROCEDURE — D9220A PRA ANESTHESIA: Mod: ANES,,, | Performed by: ANESTHESIOLOGY

## 2024-01-26 PROCEDURE — 71000033 HC RECOVERY, INTIAL HOUR: Performed by: ORTHOPAEDIC SURGERY

## 2024-01-26 PROCEDURE — 63600175 PHARM REV CODE 636 W HCPCS

## 2024-01-26 PROCEDURE — C1769 GUIDE WIRE: HCPCS | Performed by: ORTHOPAEDIC SURGERY

## 2024-01-26 PROCEDURE — 27201423 OPTIME MED/SURG SUP & DEVICES STERILE SUPPLY: Performed by: ORTHOPAEDIC SURGERY

## 2024-01-26 PROCEDURE — C1713 ANCHOR/SCREW BN/BN,TIS/BN: HCPCS | Performed by: ORTHOPAEDIC SURGERY

## 2024-01-26 DEVICE — SCREW SQUARE LOK TI 2.7X15MM: Type: IMPLANTABLE DEVICE | Site: RADIUS | Status: FUNCTIONAL

## 2024-01-26 DEVICE — SCREW LP NON LOCK 2.7X13MM: Type: IMPLANTABLE DEVICE | Site: RADIUS | Status: FUNCTIONAL

## 2024-01-26 DEVICE — IMPLANTABLE DEVICE: Type: IMPLANTABLE DEVICE | Site: RADIUS | Status: FUNCTIONAL

## 2024-01-26 DEVICE — SCREW SQUARE LOK TI 2.7X20MM: Type: IMPLANTABLE DEVICE | Site: RADIUS | Status: FUNCTIONAL

## 2024-01-26 DEVICE — SCREW BONE CORTICAL 2.7X16MM: Type: IMPLANTABLE DEVICE | Site: RADIUS | Status: FUNCTIONAL

## 2024-01-26 DEVICE — SCREW ALPS LP N LOK 2.7X16MM: Type: IMPLANTABLE DEVICE | Site: RADIUS | Status: FUNCTIONAL

## 2024-01-26 DEVICE — SCREW SQUARE LOK TI 2.7X22MM: Type: IMPLANTABLE DEVICE | Site: RADIUS | Status: FUNCTIONAL

## 2024-01-26 DEVICE — SCREW LOW PROF NLOK 2.7X24MM: Type: IMPLANTABLE DEVICE | Site: RADIUS | Status: FUNCTIONAL

## 2024-01-26 DEVICE — SCREW BONE CORTICAL 2.7X14MM: Type: IMPLANTABLE DEVICE | Site: RADIUS | Status: FUNCTIONAL

## 2024-01-26 DEVICE — PEG SMOOTH LOCK 2.2X18MM: Type: IMPLANTABLE DEVICE | Site: RADIUS | Status: FUNCTIONAL

## 2024-01-26 DEVICE — SCREW SQUARE LOK 2.7X14MM: Type: IMPLANTABLE DEVICE | Site: RADIUS | Status: FUNCTIONAL

## 2024-01-26 DEVICE — PEG SMOOTH LOCK 2.2X20MM: Type: IMPLANTABLE DEVICE | Site: RADIUS | Status: FUNCTIONAL

## 2024-01-26 RX ORDER — MIDAZOLAM HYDROCHLORIDE 1 MG/ML
2 INJECTION INTRAMUSCULAR; INTRAVENOUS
Status: COMPLETED | OUTPATIENT
Start: 2024-01-26 | End: 2024-01-26

## 2024-01-26 RX ORDER — PROPOFOL 10 MG/ML
VIAL (ML) INTRAVENOUS
Status: DISCONTINUED | OUTPATIENT
Start: 2024-01-26 | End: 2024-01-26

## 2024-01-26 RX ORDER — PHENYLEPHRINE HCL IN 0.9% NACL 1 MG/10 ML
SYRINGE (ML) INTRAVENOUS
Status: DISCONTINUED | OUTPATIENT
Start: 2024-01-26 | End: 2024-01-26

## 2024-01-26 RX ORDER — TRAMADOL HYDROCHLORIDE 50 MG/1
50 TABLET ORAL EVERY 4 HOURS PRN
Status: DISCONTINUED | OUTPATIENT
Start: 2024-01-26 | End: 2024-01-26 | Stop reason: HOSPADM

## 2024-01-26 RX ORDER — HALOPERIDOL 5 MG/ML
1 INJECTION INTRAMUSCULAR ONCE AS NEEDED
Status: DISCONTINUED | OUTPATIENT
Start: 2024-01-26 | End: 2024-01-26 | Stop reason: HOSPADM

## 2024-01-26 RX ORDER — ONDANSETRON HYDROCHLORIDE 2 MG/ML
INJECTION, SOLUTION INTRAVENOUS
Status: DISCONTINUED | OUTPATIENT
Start: 2024-01-26 | End: 2024-01-26

## 2024-01-26 RX ORDER — METHOCARBAMOL 500 MG/1
500 TABLET, FILM COATED ORAL 3 TIMES DAILY
Qty: 30 TABLET | Refills: 0 | Status: SHIPPED | OUTPATIENT
Start: 2024-01-26 | End: 2024-02-05

## 2024-01-26 RX ORDER — MIDAZOLAM HYDROCHLORIDE 1 MG/ML
INJECTION INTRAMUSCULAR; INTRAVENOUS
Status: COMPLETED
Start: 2024-01-26 | End: 2024-01-26

## 2024-01-26 RX ORDER — MUPIROCIN 20 MG/G
OINTMENT TOPICAL
Status: DISCONTINUED | OUTPATIENT
Start: 2024-01-26 | End: 2024-01-26 | Stop reason: HOSPADM

## 2024-01-26 RX ORDER — MORPHINE SULFATE 10 MG/ML
4 INJECTION INTRAMUSCULAR; INTRAVENOUS; SUBCUTANEOUS EVERY 6 HOURS PRN
Status: DISCONTINUED | OUTPATIENT
Start: 2024-01-26 | End: 2024-01-26 | Stop reason: HOSPADM

## 2024-01-26 RX ORDER — KETOROLAC TROMETHAMINE 30 MG/ML
15 INJECTION, SOLUTION INTRAMUSCULAR; INTRAVENOUS ONCE
Status: DISCONTINUED | OUTPATIENT
Start: 2024-01-26 | End: 2024-01-26 | Stop reason: HOSPADM

## 2024-01-26 RX ORDER — KETOROLAC TROMETHAMINE 10 MG/1
10 TABLET, FILM COATED ORAL EVERY 6 HOURS PRN
Qty: 20 TABLET | Refills: 0 | Status: SHIPPED | OUTPATIENT
Start: 2024-01-26 | End: 2024-01-31

## 2024-01-26 RX ORDER — METHOCARBAMOL 750 MG/1
750 TABLET, FILM COATED ORAL 3 TIMES DAILY
Status: DISCONTINUED | OUTPATIENT
Start: 2024-01-26 | End: 2024-01-26 | Stop reason: HOSPADM

## 2024-01-26 RX ORDER — CEFAZOLIN SODIUM 2 G/50ML
2 SOLUTION INTRAVENOUS
Status: COMPLETED | OUTPATIENT
Start: 2024-01-26 | End: 2024-01-26

## 2024-01-26 RX ORDER — ROPIVACAINE HYDROCHLORIDE 5 MG/ML
30 INJECTION, SOLUTION EPIDURAL; INFILTRATION; PERINEURAL ONCE
Status: DISCONTINUED | OUTPATIENT
Start: 2024-01-26 | End: 2024-01-26 | Stop reason: HOSPADM

## 2024-01-26 RX ORDER — DEXAMETHASONE SODIUM PHOSPHATE 4 MG/ML
INJECTION, SOLUTION INTRA-ARTICULAR; INTRALESIONAL; INTRAMUSCULAR; INTRAVENOUS; SOFT TISSUE
Status: DISCONTINUED | OUTPATIENT
Start: 2024-01-26 | End: 2024-01-26

## 2024-01-26 RX ORDER — LIDOCAINE HYDROCHLORIDE 20 MG/ML
INJECTION, SOLUTION EPIDURAL; INFILTRATION; INTRACAUDAL; PERINEURAL
Status: DISCONTINUED | OUTPATIENT
Start: 2024-01-26 | End: 2024-01-26

## 2024-01-26 RX ORDER — ROPIVACAINE HYDROCHLORIDE 5 MG/ML
INJECTION, SOLUTION EPIDURAL; INFILTRATION; PERINEURAL
Status: COMPLETED
Start: 2024-01-26 | End: 2024-01-26

## 2024-01-26 RX ORDER — ONDANSETRON HYDROCHLORIDE 2 MG/ML
4 INJECTION, SOLUTION INTRAVENOUS ONCE AS NEEDED
Status: COMPLETED | OUTPATIENT
Start: 2024-01-26 | End: 2024-01-26

## 2024-01-26 RX ORDER — ONDANSETRON HYDROCHLORIDE 2 MG/ML
INJECTION, SOLUTION INTRAVENOUS
Status: COMPLETED
Start: 2024-01-26 | End: 2024-01-26

## 2024-01-26 RX ORDER — IBUPROFEN 800 MG/1
800 TABLET ORAL EVERY 6 HOURS PRN
COMMUNITY

## 2024-01-26 RX ORDER — ROPIVACAINE HYDROCHLORIDE 5 MG/ML
INJECTION, SOLUTION EPIDURAL; INFILTRATION; PERINEURAL
Status: COMPLETED | OUTPATIENT
Start: 2024-01-26 | End: 2024-01-26

## 2024-01-26 RX ORDER — SODIUM CHLORIDE 0.9 % (FLUSH) 0.9 %
10 SYRINGE (ML) INJECTION
Status: DISCONTINUED | OUTPATIENT
Start: 2024-01-26 | End: 2024-01-26 | Stop reason: HOSPADM

## 2024-01-26 RX ORDER — TRAMADOL HYDROCHLORIDE 50 MG/1
50 TABLET ORAL EVERY 4 HOURS PRN
Qty: 42 TABLET | Refills: 0 | Status: SHIPPED | OUTPATIENT
Start: 2024-01-26 | End: 2024-02-02

## 2024-01-26 RX ADMIN — SODIUM CHLORIDE, SODIUM GLUCONATE, SODIUM ACETATE, POTASSIUM CHLORIDE AND MAGNESIUM CHLORIDE: 526; 502; 368; 37; 30 INJECTION, SOLUTION INTRAVENOUS at 09:01

## 2024-01-26 RX ADMIN — Medication 100 MCG: at 10:01

## 2024-01-26 RX ADMIN — MIDAZOLAM HYDROCHLORIDE 2 MG: 1 INJECTION INTRAMUSCULAR; INTRAVENOUS at 09:01

## 2024-01-26 RX ADMIN — LIDOCAINE HYDROCHLORIDE 60 MG: 20 INJECTION, SOLUTION EPIDURAL; INFILTRATION; INTRACAUDAL; PERINEURAL at 09:01

## 2024-01-26 RX ADMIN — ONDANSETRON 4 MG: 2 INJECTION INTRAMUSCULAR; INTRAVENOUS at 09:01

## 2024-01-26 RX ADMIN — ROPIVACAINE HYDROCHLORIDE 30 ML: 5 INJECTION, SOLUTION EPIDURAL; INFILTRATION; PERINEURAL at 09:01

## 2024-01-26 RX ADMIN — Medication 100 MCG: at 09:01

## 2024-01-26 RX ADMIN — CEFAZOLIN SODIUM 2 G: 2 SOLUTION INTRAVENOUS at 09:01

## 2024-01-26 RX ADMIN — PROPOFOL 130 MG: 10 INJECTION, EMULSION INTRAVENOUS at 09:01

## 2024-01-26 RX ADMIN — MIDAZOLAM 2 MG: 1 INJECTION INTRAMUSCULAR; INTRAVENOUS at 09:01

## 2024-01-26 RX ADMIN — DEXAMETHASONE SODIUM PHOSPHATE 4 MG: 4 INJECTION, SOLUTION INTRA-ARTICULAR; INTRALESIONAL; INTRAMUSCULAR; INTRAVENOUS; SOFT TISSUE at 09:01

## 2024-01-26 RX ADMIN — PHENYLEPHRINE HYDROCHLORIDE 0.55 MCG/KG/MIN: 10 INJECTION INTRAVENOUS at 09:01

## 2024-01-26 RX ADMIN — ONDANSETRON HYDROCHLORIDE 4 MG: 2 INJECTION, SOLUTION INTRAVENOUS at 11:01

## 2024-01-26 RX ADMIN — ONDANSETRON 4 MG: 2 INJECTION INTRAMUSCULAR; INTRAVENOUS at 11:01

## 2024-01-26 RX ADMIN — MUPIROCIN: 20 OINTMENT TOPICAL at 07:01

## 2024-01-26 NOTE — TRANSFER OF CARE
"Anesthesia Transfer of Care Note    Patient: Maryellen Velez    Procedure(s) Performed: Procedure(s) (LRB):  ORIF, FRACTURE, RADIUS, DISTAL, RIGHT (Right)    Patient location: PACU    Anesthesia Type: general    Transport from OR: Transported from OR on room air with adequate spontaneous ventilation    Post pain: adequate analgesia    Post assessment: no apparent anesthetic complications    Post vital signs: stable    Level of consciousness: sedated and responds to stimulation    Nausea/Vomiting: no nausea/vomiting    Complications: none    Transfer of care protocol was followed      Last vitals: Visit Vitals  /61   Pulse 86   Temp 36.3 °C (97.3 °F)   Resp 17   Ht 5' 5" (1.651 m)   Wt 54.4 kg (119 lb 14.9 oz)   LMP  (LMP Unknown)   SpO2 100%   Breastfeeding No   BMI 19.96 kg/m²     "

## 2024-01-26 NOTE — ANESTHESIA POSTPROCEDURE EVALUATION
Anesthesia Post Evaluation    Patient: Maryellen Velez    Procedure(s) Performed: Procedure(s) (LRB):  ORIF, FRACTURE, RADIUS, DISTAL, RIGHT (Right)    Final Anesthesia Type: general      Patient location during evaluation: PACU  Patient participation: Yes- Able to Participate  Level of consciousness: awake  Post-procedure vital signs: reviewed and stable  Pain management: adequate  Airway patency: patent  ROSIO mitigation strategies: Postoperative administration of CPAP, nasopharyngeal airway, or oral appliance in the postanesthesia care unit (PACU)  PONV status at discharge: No PONV  Anesthetic complications: no      Cardiovascular status: hemodynamically stable  Respiratory status: unassisted and spontaneous ventilation  Hydration status: euvolemic  Follow-up not needed.              Vitals Value Taken Time   /72 01/26/24 1142   Temp 36.7 °C (98 °F) 01/26/24 1100   Pulse 96 01/26/24 1218   Resp 22 01/26/24 1142   SpO2 92 % 01/26/24 1218         Event Time   Out of Recovery 11:30:00         Pain/Rolf Score: Rolf Score: 10 (1/26/2024 12:26 PM)

## 2024-01-26 NOTE — PROGRESS NOTES
Pt Hx and procedure discussed in detail. Post op orders reviewed. Procedure site assessed and intact. Pt attached to v/s machine and vitals assessed. Everyone understands pt info with no further questions.

## 2024-01-26 NOTE — DISCHARGE INSTRUCTIONS
-NO driving and NO alcohol consumption for 24 hours and while taking narcotic pain medication.    -Follow up appointment scheduled for: Please call [phone] for 2 week follow up appointment.    -Wound care: Leave splint on. Keep clean, dry and intact. Do not get wet.    -Monitor for signs of INFECTION: redness, swelling, drainage/pus/foul odor, fever, chills. Also, monitor extremity for good circulation (pink, warm, etc)    - Weight bearing status: NON WEIGHT BEARING TO RIGHT UPPER EXTREMITY    -Apply ICE and ELEVATE extremity as needed to aid in pain and inflammation.    -Report to your nearest ER/Notify your doctor if you experience any SUDDEN/SEVERE chest pain/abdominal pain, weakness, trouble breathing, uncontrolled pain.

## 2024-01-26 NOTE — INTERVAL H&P NOTE
I have seen the patient and reviewed this note, and there is no change in history and physical since the last exam.    Bunny Monroe MD

## 2024-01-26 NOTE — OP NOTE
[facility]                       [address]                      [place] [address]    PATIENT NAME:      THEODORA KANG  YOB: 1955  CSN:               [id number]  MRN:               [id number]  ADMIT DATE:        01/26/2024 06:23:00  PHYSICIAN:         Bunny Monroe MD                          OPERATIVE REPORT      DATE OF SURGERY:    01/26/2024 00:00:00    SURGEON:  Bunny Monroe MD    PREOPERATIVE DIAGNOSIS:  Right intraarticular distal radius fracture.    POSTOPERATIVE DIAGNOSIS:  Right intraarticular distal radius fracture.    PROCEDURE:  Open reduction and internal fixation of intraarticular distal radius   fracture including 3 more fragments.    ANESTHESIA:  General.    ESTIMATED BLOOD LOSS:  15 mL.    TOURNIQUET TIME:  43 minute.    IMPLANTS:  Biomet DVR Crosslock plate.    COMPLICATIONS:  None.    ASSISTANT:  TRANG Qunin necessary for a skilled set of hands to assist   with reduction of the fracture as well as application of hardware and deep   closure.    INDICATIONS FOR PROCEDURE:  Ms. Kang is a 68-year-old female who had a fall on   her outstretched upper extremity.  She sustained an intraarticular distal   radius fracture.  She was seen in the emergency department.  She had a closed   manipulation performed.  I saw her in the office and the risks, benefits, and   alternatives to treatment were discussed at length with the patient.  She is   brought to the operating room today for operative stabilization of her distal   radius fracture.    PROCEDURE IN DETAIL:  After informed consent was obtained, the patient was met   in the preoperative holding area and her site was marked.  She was taken to the   operating room.  She was placed supine on the operating table and general   anesthesia was induced.  All bony prominences were well padded and perioperative   antibiotics were given.  The limb was exsanguinated  and the tourniquet was   raised after she was prepped and draped in a standard sterile fashion.  A   time-out was done indicating correct operative limb and procedure.  A volar   approach to the distal radius was performed.  The fracture was identified.  It   was pulled out to length.  Multiple K-wires were used to capture the   intraarticular fragments.  A pointed reduction clamp was used to reduce the   distal metaphysis back to the shaft and correct for transitional deformity.  A   volar plate was applied, positioned distally in appropriate position.    Nonlocking screws were used to bring the plate down to bone distally followed by   multiple locking screws in the shaft.  The plate was then reduced to the shaft   and clamped.  Again, provisional stabilization was performed with K-wires and   was confirmed to be in appropriate position.  It was out to length.  The   intraarticular components were captured distally and nonlocking screws used to   bring the plate down to bone.  Multiple locking screws were then placed into the   shaft as well.  All the provisional stabilization was removed.  Final   fluoroscopic images were obtained that showed the hardware was in appropriate   position.  Her DRUJ was stable through a range of motion with anterior and   posterior translation.  Hemostasis was obtained.  The wound was thoroughly   irrigated.  A gram of vancomycin was placed deep within the wound.  A layered   closure was performed with a 2-0 Monocryl, 3-0 nylon.  Xeroform, 4x4's, cast   padding, and a volar resting splint were applied.  The patient was awakened,   extubated, and taken to recovery in stable condition.    POSTOPERATIVE PLAN:  She will be discharged home today.  She is going to be   nonweightbearing to the right upper extremity, full range of motion of the   digits and the elbow.  Follow up in the office in 2 weeks for removal of her   sutures and application of a Velcro wrist  kamran.        ______________________________  MD TAMAR Valles/ZENOBIA  DD:  01/26/2024  Time:  10:50AM  DT:  01/26/2024  Time:  11:11AM  Job #:  [id number]      OPERATIVE REPORT

## 2024-01-26 NOTE — ANESTHESIA PROCEDURE NOTES
Peripheral Block    Patient location during procedure: holding area   Block not for primary anesthetic.  Reason for block: at surgeon's request and post-op pain management   Post-op Pain Location: right wrist   Start time: 1/26/2024 9:20 AM  Timeout: 1/26/2024 9:20 AM   End time: 1/26/2024 9:26 AM    Staffing  Authorizing Provider: Henry Harrell MD  Performing Provider: Henry Harrell MD    Staffing  Performed by: Henry Harrell MD  Authorized by: Henry Harrell MD    Preanesthetic Checklist  Completed: patient identified, IV checked, site marked, risks and benefits discussed, surgical consent, monitors and equipment checked, pre-op evaluation and timeout performed  Peripheral Block  Patient position: supine  Prep: ChloraPrep  Patient monitoring: heart rate, cardiac monitor, continuous pulse ox, continuous capnometry and frequent blood pressure checks  Block type: axillary  Laterality: right  Injection technique: single shot  Needle  Needle type: Echogenic   Needle gauge: 21 G  Needle length: 4 in  Needle localization: nerve stimulator, paresthesias and ultrasound guidance  Test dose: negative   -ultrasound image captured on disc.  Assessment  Injection assessment: negative aspiration, negative parasthesia, local visualized surrounding nerve and positive parasthesia  Paresthesia pain: immediately resolved  Heart rate change: no  Slow fractionated injection: no  Pain Tolerance: comfortable throughout block and no complaints  Medications:    Medications: ropivacaine (NAROPIN) injection 0.5% - Perineural, Right Axillary   30 mL - 1/26/2024 9:25:00 AM

## 2024-01-26 NOTE — BRIEF OP NOTE
[facility] - Periop Services  Brief Operative Note    Surgery Date: 1/26/2024     Surgeon(s) and Role:     * Bunny Monroe MD - Primary     * Afua Rod PA-C - Assisting        Pre-op Diagnosis:  Other intraarticular fracture of lower end of right radius, initial encounter for closed fracture [S52.571A]    Post-op Diagnosis:  Post-Op Diagnosis Codes:     * Other intraarticular fracture of lower end of right radius, initial encounter for closed fracture [S52.571A]    Procedure(s) (LRB):  ORIF, FRACTURE, RADIUS, DISTAL, RIGHT (Right)    Anesthesia: General/Regional    Operative Findings: see op report    Estimated Blood Loss: 15mL         Specimens:   Specimen (24h ago, onward)      None              Discharge Note    OUTCOME: Patient tolerated treatment/procedure well without complication and is now ready for discharge.    DISPOSITION: Home or Self Care    FINAL DIAGNOSIS:  as above    FOLLOWUP: In clinic    DISCHARGE INSTRUCTIONS:    -D/C home  -NWB RUE  -Keep splint c/d/I  -F/U in 2 weeks      Bunny Monroe MD  Orthopedic Trauma  [facility]

## 2024-01-26 NOTE — ANESTHESIA PREPROCEDURE EVALUATION
01/26/2024  Maryellen Velez is a 68 y.o., female.      Pre-op Assessment    I have reviewed the Patient Summary Reports.     I have reviewed the Nursing Notes. I have reviewed the NPO Status.   I have reviewed the Medications.     Review of Systems  Social:  Former Smoker       Pulmonary:   COPD, mild                     Musculoskeletal:  Arthritis                   Physical Exam  General: Cooperative, Well nourished, Alert and Oriented    Airway:  Mallampati: II   Mouth Opening: Normal    Dental:  Edentulous    Chest/Lungs:  Clear to auscultation    Heart:  Rate: Normal        Anesthesia Plan  Type of Anesthesia, risks & benefits discussed:    Anesthesia Type: Gen Supraglottic Airway, Regional  Intra-op Monitoring Plan: Standard ASA Monitors  Post Op Pain Control Plan: multimodal analgesia  Induction:  IV  Airway Plan: Direct, Post-Induction  Informed Consent: Informed consent signed with the Patient and all parties understand the risks and agree with anesthesia plan.  All questions answered.   ASA Score: 3  Day of Surgery Review of History & Physical: H&P Update referred to the surgeon/provider.    Ready For Surgery From Anesthesia Perspective.     .

## 2024-01-26 NOTE — ANESTHESIA PROCEDURE NOTES
Intubation    Date/Time: 1/26/2024 9:40 AM    Performed by: Carmelo Rai CRNA  Authorized by: Henry Harrell MD    Intubation:     Induction:  Intravenous    Intubated:  Postinduction    Mask Ventilation:  Easy mask    Attempts:  1    Attempted By:  CRNA    Difficult Airway Encountered?: No      Airway Device:  Supraglottic airway/LMA    Airway Device Size:  3.0    Style/Cuff Inflation:  Cuffed (inflated to minimal occlusive pressure)    Inflation Amount (mL):  18    Placement Verified By:  Capnometry    Complicating Factors:  None    Findings Post-Intubation:  BS equal bilateral and atraumatic/condition of teeth unchanged

## 2024-01-27 ENCOUNTER — PATIENT MESSAGE (OUTPATIENT)
Dept: ADMINISTRATIVE | Facility: OTHER | Age: 69
End: 2024-01-27
Payer: MEDICARE

## 2024-01-29 VITALS
HEIGHT: 65 IN | TEMPERATURE: 98 F | WEIGHT: 119.94 LBS | SYSTOLIC BLOOD PRESSURE: 181 MMHG | DIASTOLIC BLOOD PRESSURE: 76 MMHG | BODY MASS INDEX: 19.98 KG/M2 | OXYGEN SATURATION: 93 % | HEART RATE: 106 BPM | RESPIRATION RATE: 22 BRPM

## 2024-02-12 ENCOUNTER — OFFICE VISIT (OUTPATIENT)
Dept: ORTHOPEDICS | Facility: CLINIC | Age: 69
End: 2024-02-12
Payer: MEDICARE

## 2024-02-12 VITALS
SYSTOLIC BLOOD PRESSURE: 164 MMHG | WEIGHT: 119 LBS | DIASTOLIC BLOOD PRESSURE: 102 MMHG | BODY MASS INDEX: 19.83 KG/M2 | HEIGHT: 65 IN | HEART RATE: 100 BPM

## 2024-02-12 DIAGNOSIS — S52.571A OTHER INTRAARTICULAR FRACTURE OF LOWER END OF RIGHT RADIUS, INITIAL ENCOUNTER FOR CLOSED FRACTURE: Primary | ICD-10-CM

## 2024-02-12 PROCEDURE — 99024 POSTOP FOLLOW-UP VISIT: CPT | Mod: ,,, | Performed by: NURSE PRACTITIONER

## 2024-02-12 PROCEDURE — 1159F MED LIST DOCD IN RCRD: CPT | Mod: CPTII,,, | Performed by: NURSE PRACTITIONER

## 2024-02-12 PROCEDURE — 3077F SYST BP >= 140 MM HG: CPT | Mod: CPTII,,, | Performed by: NURSE PRACTITIONER

## 2024-02-12 PROCEDURE — 3288F FALL RISK ASSESSMENT DOCD: CPT | Mod: CPTII,,, | Performed by: NURSE PRACTITIONER

## 2024-02-12 PROCEDURE — 1160F RVW MEDS BY RX/DR IN RCRD: CPT | Mod: CPTII,,, | Performed by: NURSE PRACTITIONER

## 2024-02-12 PROCEDURE — 1101F PT FALLS ASSESS-DOCD LE1/YR: CPT | Mod: CPTII,,, | Performed by: NURSE PRACTITIONER

## 2024-02-12 PROCEDURE — 3080F DIAST BP >= 90 MM HG: CPT | Mod: CPTII,,, | Performed by: NURSE PRACTITIONER

## 2024-02-12 NOTE — PROGRESS NOTES
Chief Complaint:   Chief Complaint   Patient presents with    Post-op Evaluation     2 wks s/p, Rt radius ORIF sx 24 gl 24, pt states doing good, states pain has been manageable, presents in splint, incision looks good, has some swelling, has no other complaints at this time,       History of present illness: Maryellen Velez is a 68 y.o. female, presents to clinic today in regards to her right wrist.  Patient is 2 weeks out from surgery.  Has been compliant with nonweightbearing status.  Splint was intact.  For pain she is taking Tylenol.  This has minimal.    Past Medical History:   Diagnosis Date    COPD (chronic obstructive pulmonary disease)     Hyperlipidemia     Personal history of colonic polyps 2017    Outside Records       Past Surgical History:   Procedure Laterality Date     SECTION      COLONOSCOPY  2017    Outside Records    ELBOW SURGERY Left     FRACTURE SURGERY  May 2016    NECK SURGERY      OPEN REDUCTION AND INTERNAL FIXATION (ORIF) OF FRACTURE OF DISTAL RADIUS Right 2024    Procedure: ORIF, FRACTURE, RADIUS, DISTAL, RIGHT;  Surgeon: Bunny Monroe MD;  Location: [facility];  Service: Orthopedics;  Laterality: Right;  Supine, arm table, tourniquet  Biomet DVR   2nds case, ok for block    SHOULDER SURGERY Left     TUBAL LIGATION  1984    WRIST SURGERY Left        Current Outpatient Medications   Medication Sig    acetaminophen (TYLENOL) 500 MG tablet Take 500 mg by mouth every 6 (six) hours as needed.    budesonide-formoterol 160-4.5 mcg (SYMBICORT) 160-4.5 mcg/actuation HFAA Inhale into the lungs.    doxycycline (MONODOX) 100 MG capsule Take 1 capsule (100 mg total) by mouth every 12 (twelve) hours.    ibuprofen (ADVIL,MOTRIN) 800 MG tablet Take 800 mg by mouth every 6 (six) hours as needed for Pain.    ondansetron (ZOFRAN-ODT) 4 MG TbDL Take 1 tablet (4 mg total) by mouth every 6 (six) hours as needed (nausea).    promethazine (PHENERGAN) 12.5 MG Tab Take 1  tablet (12.5 mg total) by mouth every 6 (six) hours as needed (nausea not relieved by zofran).    tiotropium bromide (SPIRIVA RESPIMAT) 1.25 mcg/actuation inhaler Inhale into the lungs.    pravastatin (PRAVACHOL) 40 MG tablet      No current facility-administered medications for this visit.       Review of patient's allergies indicates:   Allergen Reactions    Shrimp Anaphylaxis    Hydromorphone      Other reaction(s): nausea/vomiting, Other (See Comments)  Pt. States that it cause dizziness      Shellfish containing products      Other reaction(s): hives/can't breathe    Meperidine Nausea Only     Other reaction(s): Memory loss/dyspepsia    Oxycodone-acetaminophen Nausea Only     Other reaction(s): nausea/vomiting  Patient is not truely allergic to medication. Has requested Norcomakes patient nauseous         Family History   Problem Relation Age of Onset    No Known Problems Mother     Heart attack Father     Cancer Sister              Hyperlipidemia Sister     Hypertension Sister     Hypertension Brother     Cancer Brother     COPD Brother        Social History     Socioeconomic History    Marital status:    Tobacco Use    Smoking status: Former     Current packs/day: 0.00     Average packs/day: 1 pack/day for 89.6 years (89.6 ttl pk-yrs)     Types: Cigarettes     Start date: 1970     Quit date: 2015     Years since quittin.8    Smokeless tobacco: Never   Substance and Sexual Activity    Alcohol use: Never    Drug use: Never     Types: Marijuana    Sexual activity: Not Currently     Partners: Male     Birth control/protection: None           Review of Systems:    Denies fevers, chills, chest pain, shortness of breath. Comprehensive review of systems performed and otherwise negative except as noted in HPI     Physical Examination:    General: awake and alert, no acute distress, healthy appearing  Head and Neck: Head atraumatic/normocephalic. Moist MM  CV: brisk cap refill  Lungs:  non-labored breathing, w/o cough or SOB  Skin: no rashes present, warm to touch  Neuro: sensation grossly intact distally       Vital Signs:    Vitals:    02/12/24 0947   BP: (!) 164/102   Pulse: 100       Body mass index is 19.8 kg/m².    Right wrist:   Incision site well healed with sutures intact.  No ecchymosis or erythema noted.  No active drainage or bleeding.  No signs of infection.  Range of motion very stiff with about 10° in dorsi and plantar flexion.  Sensation intact distally.  Brisk capillary refill distally.  Range of motion to all digits.  2+ palpable radial pulse.       Assessment::  Status post ORIF right distal radius    Plan:  Patient presents to clinic today about 2 weeks after surgery.  Overall she is doing well.  Splint was removed and sutures were also removed today here in clinic.  Patient was educated she can now wash this area.  We will put her in a Velcro wrist splint.  She is to continue to be nonweightbearing anything greater than 2 lb.  She does state understanding of this.  Also to come out of the Velcro wrist splint at rest for gentle range of motion. Follow-up in 4 weeks for repeat x-rays and evaluation. All questions and concerns were addressed. The patient understands and agrees with the plan of care.    The above findings, diagnostics, and treatment plan were discussed with Dr. Bunny Monroe who is in agreement with the plan of care.      This note was created using [employer] voice recognition software that occasionally misinterpreted phrases or words.    Consult note is delivered via Epic messaging service.

## 2024-03-13 ENCOUNTER — OFFICE VISIT (OUTPATIENT)
Dept: ORTHOPEDICS | Facility: CLINIC | Age: 69
End: 2024-03-13
Payer: MEDICARE

## 2024-03-13 ENCOUNTER — HOSPITAL ENCOUNTER (OUTPATIENT)
Dept: RADIOLOGY | Facility: CLINIC | Age: 69
Discharge: HOME | End: 2024-03-13
Attending: ORTHOPAEDIC SURGERY
Payer: MEDICARE

## 2024-03-13 VITALS
SYSTOLIC BLOOD PRESSURE: 133 MMHG | WEIGHT: 119.06 LBS | BODY MASS INDEX: 19.81 KG/M2 | HEART RATE: 99 BPM | DIASTOLIC BLOOD PRESSURE: 75 MMHG

## 2024-03-13 DIAGNOSIS — S52.571D CLOSED DIE PUNCH FRACTURE OF DISTAL RADIUS, RIGHT, WITH ROUTINE HEALING, SUBSEQUENT ENCOUNTER: Primary | ICD-10-CM

## 2024-03-13 DIAGNOSIS — S52.571D CLOSED DIE PUNCH FRACTURE OF DISTAL RADIUS, RIGHT, WITH ROUTINE HEALING, SUBSEQUENT ENCOUNTER: ICD-10-CM

## 2024-03-13 PROCEDURE — 1159F MED LIST DOCD IN RCRD: CPT | Mod: CPTII,,,

## 2024-03-13 PROCEDURE — 3078F DIAST BP <80 MM HG: CPT | Mod: CPTII,,,

## 2024-03-13 PROCEDURE — 3288F FALL RISK ASSESSMENT DOCD: CPT | Mod: CPTII,,,

## 2024-03-13 PROCEDURE — 1160F RVW MEDS BY RX/DR IN RCRD: CPT | Mod: CPTII,,,

## 2024-03-13 PROCEDURE — 1101F PT FALLS ASSESS-DOCD LE1/YR: CPT | Mod: CPTII,,,

## 2024-03-13 PROCEDURE — 3075F SYST BP GE 130 - 139MM HG: CPT | Mod: CPTII,,,

## 2024-03-13 PROCEDURE — 73110 X-RAY EXAM OF WRIST: CPT | Mod: RT,,, | Performed by: ORTHOPAEDIC SURGERY

## 2024-03-13 PROCEDURE — 99024 POSTOP FOLLOW-UP VISIT: CPT | Mod: ,,,

## 2024-03-13 NOTE — PROGRESS NOTES
[facility]      Name: Maryellen Velez  : 1955  MRN: [id number]  Date: 2024    Chief Complaint   Patient presents with    Right Wrist - Follow-up     6.5 week f/u orif right distal radius fx, in velcro wrist splint.  Taking tylenol, advil for pain.         Subjective:       Patient ID: Maryellen Velez is a 68 y.o. female.  Chief Complaint   Patient presents with    Right Wrist - Follow-up     6.5 week f/u orif right distal radius fx, in velcro wrist splint.  Taking tylenol, advil for pain.          HPI  Pt presents for 6.5 wk f/u of R distal radius ORIF.  Reports she is doing well and pain is controlled with OTC medications.  Is participating in PT.  No complaints at this time.     ROS:  Constitutional: Denies fever chills  Eyes: No change in vision  ENT: No ringing or current infections  CV: No chest pain  Resp: No labored breathing  MSK: Pain evident at site of injury located in HPI,   Integ: No signs of abrasions or lacerations  Neuro: No numbness or tingling  Lymphatic: No swelling outside the area of injury     Current Outpatient Medications   Medication Instructions    acetaminophen (TYLENOL) 500 mg, Oral, Every 6 hours PRN    budesonide-formoterol 160-4.5 mcg (SYMBICORT) 160-4.5 mcg/actuation HFAA Inhalation    doxycycline (MONODOX) 100 mg, Oral, Every 12 hours    ibuprofen (ADVIL,MOTRIN) 800 mg, Oral, Every 6 hours PRN    ondansetron (ZOFRAN-ODT) 4 mg, Oral, Every 6 hours PRN    pravastatin (PRAVACHOL) 40 MG tablet No dose, route, or frequency recorded.    promethazine (PHENERGAN) 12.5 mg, Oral, Every 6 hours PRN    tiotropium bromide (SPIRIVA RESPIMAT) 1.25 mcg/actuation inhaler Inhalation          Objective:      Visit Vitals  /75   Pulse 99   Wt 54 kg (119 lb 0.8 oz)   LMP  (LMP Unknown)   BMI 19.81 kg/m²     Physical Exam    General the patient is alert and oriented x3 no acute distress nontoxic-appearing appropriate affect.    Constitutional: Vital signs  "are examined and stable.  Resp: No signs of labored breathing                        RUE: -Skin: Incision well healed. Skin warm and dry.            -MSK:  PROM of wrist with tightness at terminal ends.  Digits and elbow with full AROM.   5/5.             -Neuro:  Light sensation intact .           -Lymphatic: No signs of lymphadenopathy           -CV: Capillary refill is less than 2 seconds. Radial pulse 2+. Compartments soft and compressible.          Body mass index is 19.81 kg/m².  Ideal body weight: 57 kg (125 lb 10.6 oz)  Hgb   Date Value Ref Range Status   01/08/2024 13.5 12.0 - 16.0 g/dL Final   01/02/2024 12.9 12.0 - 16.0 g/dL Final     Hct   Date Value Ref Range Status   01/08/2024 40.2 37.0 - 47.0 % Final   01/02/2024 41.5 37.0 - 47.0 % Final     No results found for: "[id number]"  WBC   Date Value Ref Range Status   01/08/2024 5.31 4.50 - 11.50 x10(3)/mcL Final   01/02/2024 4.82 4.50 - 11.50 x10(3)/mcL Final       Radiology:   XR complete right wrist:  Hardware intact.  Ulna fx seen again with bone healing.  Alignment of radius and ulna fx appropriate.             Assessment:           ICD-10-CM ICD-9-CM   1. Closed die punch fracture of distal radius, right, with routine healing, subsequent encounter  S52.571D V54.12           Plan:         Follow up in about 6 weeks (around 4/24/2024) for Repeat X-rays, Follow up.    1. Closed die punch fracture of distal radius, right, with routine healing, subsequent encounter  Overview:  DOS 1/26/24    Assessment & Plan:  Pt doing well and participating in PT.  Will increase activity to lifting, pushing, pulling <10 lbs.  PROM of wrist yields tightness at terminal ends.  Will cont PT for increased ROM as well.  F/u 6 wks for repeat imaging.     Orders:  -     X-Ray Wrist Complete Right; Future; Expected date: 03/13/2024  -     Ambulatory referral/consult to Physical/Occupational Therapy; Future; Expected date: 03/13/2024          Allowed time for questions.  " Questions answered to pt satisfaction.  Pt verbalizes understanding and agrees with tx plan.   Pt to call clinic with any questions/concerns.     The above findings, diagnostics, and treatment plan were discussed with Dr. Monroe who is in agreement with the plan of care except as stated in additional documentation.     [facility]        Future Appointments   Date Time Provider Department Center   4/24/2024  9:45 AM Bunny Monroe MD [facility] [place]   5/9/2024  8:30 AM Gaby Zimmerman FNP [facility] [place]

## 2024-03-13 NOTE — ASSESSMENT & PLAN NOTE
Pt doing well and participating in PT.  Will increase activity to lifting, pushing, pulling <10 lbs.  PROM of wrist yields tightness at terminal ends.  Will cont PT for increased ROM as well.  F/u 6 wks for repeat imaging.

## 2024-04-24 ENCOUNTER — HOSPITAL ENCOUNTER (OUTPATIENT)
Dept: RADIOLOGY | Facility: CLINIC | Age: 69
Discharge: HOME | End: 2024-04-24
Attending: ORTHOPAEDIC SURGERY
Payer: MEDICARE

## 2024-04-24 ENCOUNTER — OFFICE VISIT (OUTPATIENT)
Dept: ORTHOPEDICS | Facility: CLINIC | Age: 69
End: 2024-04-24
Payer: MEDICARE

## 2024-04-24 VITALS
SYSTOLIC BLOOD PRESSURE: 130 MMHG | DIASTOLIC BLOOD PRESSURE: 81 MMHG | HEIGHT: 65 IN | BODY MASS INDEX: 19.83 KG/M2 | WEIGHT: 119.06 LBS | HEART RATE: 88 BPM

## 2024-04-24 DIAGNOSIS — S52.571D CLOSED DIE PUNCH FRACTURE OF DISTAL RADIUS, RIGHT, WITH ROUTINE HEALING, SUBSEQUENT ENCOUNTER: ICD-10-CM

## 2024-04-24 DIAGNOSIS — S52.571D CLOSED DIE PUNCH FRACTURE OF DISTAL RADIUS, RIGHT, WITH ROUTINE HEALING, SUBSEQUENT ENCOUNTER: Primary | ICD-10-CM

## 2024-04-24 PROCEDURE — 73110 X-RAY EXAM OF WRIST: CPT | Mod: RT,,, | Performed by: ORTHOPAEDIC SURGERY

## 2024-04-24 PROCEDURE — 3288F FALL RISK ASSESSMENT DOCD: CPT | Mod: CPTII,,,

## 2024-04-24 PROCEDURE — 3075F SYST BP GE 130 - 139MM HG: CPT | Mod: CPTII,,,

## 2024-04-24 PROCEDURE — 1160F RVW MEDS BY RX/DR IN RCRD: CPT | Mod: CPTII,,,

## 2024-04-24 PROCEDURE — 3079F DIAST BP 80-89 MM HG: CPT | Mod: CPTII,,,

## 2024-04-24 PROCEDURE — 1159F MED LIST DOCD IN RCRD: CPT | Mod: CPTII,,,

## 2024-04-24 PROCEDURE — 1101F PT FALLS ASSESS-DOCD LE1/YR: CPT | Mod: CPTII,,,

## 2024-04-24 PROCEDURE — 99024 POSTOP FOLLOW-UP VISIT: CPT | Mod: ,,,

## 2024-04-24 NOTE — PROGRESS NOTES
"[facility]      Name: Maryellen Velez  : 1955  MRN: [id number]  Date: 2024    Chief Complaint   Patient presents with    Follow-up     3 mos,  Rt distal radius ORIF sx 24, doing good, has pain but manageable, has no other complaints at this time,        Subjective:       Patient ID: Maryellen Velez is a 68 y.o. female.    Chief Complaint   Patient presents with    Follow-up     3 mos,  Rt distal radius ORIF sx 24, doing good, has pain but manageable, has no other complaints at this time,         HPI  68-year-old female returns to clinic for three-month follow-up status post ORIF right distal radius.  Reports she is doing well.  States she is getting closer to normal but still has stiffness and intermittent soreness.  She is currently in therapy.  States she is 18 more visits with therapy.  No other complaints at this time.    ROS:  Constitutional: Denies fever chills  MSK: Pain evident at site of injury located in HPI,   Integ: No signs of abrasions or lacerations  Neuro: No numbness or tingling  Lymphatic: No swelling outside the area of injury     Current Outpatient Medications   Medication Instructions    acetaminophen (TYLENOL) 500 mg, Oral, Every 6 hours PRN    doxycycline (MONODOX) 100 mg, Oral, Every 12 hours    ibuprofen (ADVIL,MOTRIN) 800 mg, Oral, Every 6 hours PRN    tiotropium bromide (SPIRIVA RESPIMAT) 1.25 mcg/actuation inhaler Inhalation          Objective:      Visit Vitals  /81   Pulse 88   Ht 5' 5" (1.651 m)   Wt 54 kg (119 lb 0.8 oz)   LMP  (LMP Unknown)   BMI 19.81 kg/m²     Physical Exam    General the patient is alert and oriented x3 no acute distress nontoxic-appearing appropriate affect.    Constitutional: Vital signs are examined and stable.  Resp: No signs of labored breathing              RUE: -Skin: incision well healed           -MSK: STR 5/5 AIN/PIN/Median/Radial/Ulnar motor.  Limited active ROM of wrist secondary to stiffness.  " "No pain with range of motion.  Full active ROM of digits and elbow.           -Neuro:  Sensation intact to light touch C5-T1 dermatomes           -Lymphatic: No signs of  lymphadenopathy,            -CV:  Capillary refill is less than 2 seconds. Radial pulse 2+. Compartments soft and compressible          Body mass index is 19.81 kg/m².  Ideal body weight: 57 kg (125 lb 10.6 oz)  Hgb   Date Value Ref Range Status   01/08/2024 13.5 12.0 - 16.0 g/dL Final   01/02/2024 12.9 12.0 - 16.0 g/dL Final     Hct   Date Value Ref Range Status   01/08/2024 40.2 37.0 - 47.0 % Final   01/02/2024 41.5 37.0 - 47.0 % Final     No results found for: "[id number]"  WBC   Date Value Ref Range Status   01/08/2024 5.31 4.50 - 11.50 x10(3)/mcL Final   01/02/2024 4.82 4.50 - 11.50 x10(3)/mcL Final       Radiology:   Right wrist x-ray: Intact hardware without failure.  Fracture is well healed.        Assessment:           ICD-10-CM ICD-9-CM   1. Closed die punch fracture of distal radius, right, with routine healing, subsequent encounter  S52.571D V54.12           Plan:         No follow-ups on file.    1. Closed die punch fracture of distal radius, right, with routine healing, subsequent encounter  -     X-Ray Wrist Complete Right; Future; Expected date: 04/24/2024      Patient still has some limitation with range of motion of her right wrist.  She does reports she is improving and is happy with her progress.  She is continuing therapy.  States she has 18 more visits.  We will follow up in 3 months to re-evaluate her progress and final set of x-rays.    Pt verbalizes understanding and agrees with tx plan. Pt to call clinic with any questions/concerns.      The above findings, diagnostics, and treatment plan were discussed with Dr. Monroe who is in agreement with the plan of care except as stated in additional documentation.     Ashley Gunther, PA-C [facility]        Future Appointments   Date Time Provider " Department Center   5/9/2024  8:30 AM [facility]   7/17/2024 10:00 AM Bunny Monroe MD [facility]

## 2024-05-06 ENCOUNTER — LAB VISIT (OUTPATIENT)
Dept: LAB | Facility: HOSPITAL | Age: 69
End: 2024-05-06
Attending: NURSE PRACTITIONER
Payer: MEDICARE

## 2024-05-06 DIAGNOSIS — M86.632 CHRONIC OSTEOMYELITIS INVOLVING FOREARM, LEFT: ICD-10-CM

## 2024-05-06 LAB
ALBUMIN SERPL-MCNC: 3.9 G/DL (ref 3.4–4.8)
ALBUMIN/GLOB SERPL: 1.4 RATIO (ref 1.1–2)
ALP SERPL-CCNC: 79 UNIT/L (ref 40–150)
ALT SERPL-CCNC: 15 UNIT/L (ref 0–55)
AST SERPL-CCNC: 18 UNIT/L (ref 5–34)
BASOPHILS # BLD AUTO: 0.04 X10(3)/MCL
BASOPHILS NFR BLD AUTO: 0.7 %
BILIRUB SERPL-MCNC: 0.4 MG/DL
BUN SERPL-MCNC: 12.7 MG/DL (ref 9.8–20.1)
CALCIUM SERPL-MCNC: 9.7 MG/DL (ref 8.4–10.2)
CHLORIDE SERPL-SCNC: 108 MMOL/L (ref 98–107)
CO2 SERPL-SCNC: 25 MMOL/L (ref 23–31)
CREAT SERPL-MCNC: 0.73 MG/DL (ref 0.55–1.02)
CRP SERPL-MCNC: 1.8 MG/L
EOSINOPHIL # BLD AUTO: 0.29 X10(3)/MCL (ref 0–0.9)
EOSINOPHIL NFR BLD AUTO: 4.9 %
ERYTHROCYTE [DISTWIDTH] IN BLOOD BY AUTOMATED COUNT: 13.1 % (ref 11.5–17)
ERYTHROCYTE [SEDIMENTATION RATE] IN BLOOD: 17 MM/HR (ref 0–20)
GFR SERPLBLD CREATININE-BSD FMLA CKD-EPI: >60 MLS/MIN/1.73/M2
GLOBULIN SER-MCNC: 2.8 GM/DL (ref 2.4–3.5)
GLUCOSE SERPL-MCNC: 85 MG/DL (ref 82–115)
HCT VFR BLD AUTO: 41.2 % (ref 37–47)
HGB BLD-MCNC: 13.8 G/DL (ref 12–16)
IMM GRANULOCYTES # BLD AUTO: 0 X10(3)/MCL (ref 0–0.04)
IMM GRANULOCYTES NFR BLD AUTO: 0 %
LYMPHOCYTES # BLD AUTO: 1.56 X10(3)/MCL (ref 0.6–4.6)
LYMPHOCYTES NFR BLD AUTO: 26.6 %
MCH RBC QN AUTO: 29.6 PG (ref 27–31)
MCHC RBC AUTO-ENTMCNC: 33.5 G/DL (ref 33–36)
MCV RBC AUTO: 88.2 FL (ref 80–94)
MONOCYTES # BLD AUTO: 0.48 X10(3)/MCL (ref 0.1–1.3)
MONOCYTES NFR BLD AUTO: 8.2 %
NEUTROPHILS # BLD AUTO: 3.49 X10(3)/MCL (ref 2.1–9.2)
NEUTROPHILS NFR BLD AUTO: 59.6 %
PLATELET # BLD AUTO: 227 X10(3)/MCL (ref 130–400)
PMV BLD AUTO: 8.6 FL (ref 7.4–10.4)
POTASSIUM SERPL-SCNC: 4.1 MMOL/L (ref 3.5–5.1)
PROT SERPL-MCNC: 6.7 GM/DL (ref 5.8–7.6)
RBC # BLD AUTO: 4.67 X10(6)/MCL (ref 4.2–5.4)
SODIUM SERPL-SCNC: 139 MMOL/L (ref 136–145)
WBC # SPEC AUTO: 5.86 X10(3)/MCL (ref 4.5–11.5)

## 2024-05-06 PROCEDURE — 85652 RBC SED RATE AUTOMATED: CPT

## 2024-05-06 PROCEDURE — 80053 COMPREHEN METABOLIC PANEL: CPT

## 2024-05-06 PROCEDURE — 85025 COMPLETE CBC W/AUTO DIFF WBC: CPT

## 2024-05-06 PROCEDURE — 86140 C-REACTIVE PROTEIN: CPT

## 2024-05-06 PROCEDURE — 36415 COLL VENOUS BLD VENIPUNCTURE: CPT

## 2024-05-09 ENCOUNTER — OFFICE VISIT (OUTPATIENT)
Dept: INFECTIOUS DISEASES | Facility: CLINIC | Age: 69
End: 2024-05-09
Payer: MEDICARE

## 2024-05-09 VITALS
HEART RATE: 98 BPM | RESPIRATION RATE: 16 BRPM | SYSTOLIC BLOOD PRESSURE: 108 MMHG | TEMPERATURE: 98 F | BODY MASS INDEX: 20.78 KG/M2 | HEIGHT: 65 IN | WEIGHT: 124.69 LBS | DIASTOLIC BLOOD PRESSURE: 71 MMHG

## 2024-05-09 DIAGNOSIS — Z79.2 CHRONIC ANTIBIOTIC SUPPRESSION: ICD-10-CM

## 2024-05-09 DIAGNOSIS — Z12.31 SCREENING MAMMOGRAM, ENCOUNTER FOR: ICD-10-CM

## 2024-05-09 DIAGNOSIS — M86.632 CHRONIC OSTEOMYELITIS INVOLVING FOREARM, LEFT: Primary | ICD-10-CM

## 2024-05-09 PROCEDURE — 1100F PTFALLS ASSESS-DOCD GE2>/YR: CPT | Mod: CPTII,,, | Performed by: NURSE PRACTITIONER

## 2024-05-09 PROCEDURE — 3288F FALL RISK ASSESSMENT DOCD: CPT | Mod: CPTII,,, | Performed by: NURSE PRACTITIONER

## 2024-05-09 PROCEDURE — 3074F SYST BP LT 130 MM HG: CPT | Mod: CPTII,,, | Performed by: NURSE PRACTITIONER

## 2024-05-09 PROCEDURE — 99213 OFFICE O/P EST LOW 20 MIN: CPT | Mod: S$PBB,,, | Performed by: NURSE PRACTITIONER

## 2024-05-09 PROCEDURE — 1159F MED LIST DOCD IN RCRD: CPT | Mod: CPTII,,, | Performed by: NURSE PRACTITIONER

## 2024-05-09 PROCEDURE — 1125F AMNT PAIN NOTED PAIN PRSNT: CPT | Mod: CPTII,,, | Performed by: NURSE PRACTITIONER

## 2024-05-09 PROCEDURE — 99214 OFFICE O/P EST MOD 30 MIN: CPT | Mod: PBBFAC | Performed by: NURSE PRACTITIONER

## 2024-05-09 PROCEDURE — 3008F BODY MASS INDEX DOCD: CPT | Mod: CPTII,,, | Performed by: NURSE PRACTITIONER

## 2024-05-09 PROCEDURE — 1160F RVW MEDS BY RX/DR IN RCRD: CPT | Mod: CPTII,,, | Performed by: NURSE PRACTITIONER

## 2024-05-09 PROCEDURE — 3078F DIAST BP <80 MM HG: CPT | Mod: CPTII,,, | Performed by: NURSE PRACTITIONER

## 2024-05-09 RX ORDER — DOXYCYCLINE 100 MG/1
100 CAPSULE ORAL EVERY 12 HOURS
Qty: 60 CAPSULE | Refills: 6 | Status: SHIPPED | OUTPATIENT
Start: 2024-05-09

## 2024-05-09 NOTE — PROGRESS NOTES
Patient ID: Maryellen Velez 68 y.o.     Chief Complaint:   Chief Complaint   Patient presents with    Follow-up     Osteo        HPI:    Ms. Velez is a 69 y/o WF here for follow up of chronic left forearm osteomyelitis. Hx of past ulnar fx  with MRSA positive culture 2016 and bone bx culture + for MRSA 18. Pt completed 6 weeks of Daptomycin 6mg/kg IV daily 10/26/18.  She was then started on Doxycycline 100mg 1 po BID lifelong. Mrs. Bojorquez tells me she is tolerating the meds well without any complaints. She denies fever, chills, headaches, visual problems, N/V/D, SOB, cough, abdominal pain, drainage/erythema/warmth from the healed surgical site. States she injured her right wrist and had to have surgery 24. Pt is still doing PT for the right wrist.  Reviewed labs from 24 CRP 1.80 and sed rate 17.  She is due for a COVID, Tdap, PCV20, and Tdap. However, she is not amenable.  PCP is Dr. Elias in [place]. Cervical pap was done by Dr. Elias. Colonoscopy  in [place] by Dr. Childs. Last mammogram was 23 BIRADS 1. She will need an updated mammogram. I will place the order.            Past Medical History:   Diagnosis Date    COPD (chronic obstructive pulmonary disease)     Hyperlipidemia     Personal history of colonic polyps 2017    Outside Records        Past Surgical History:   Procedure Laterality Date     SECTION      COLONOSCOPY  2017    Outside Records    ELBOW SURGERY Left     FRACTURE SURGERY  May 2016    NECK SURGERY      OPEN REDUCTION AND INTERNAL FIXATION (ORIF) OF FRACTURE OF DISTAL RADIUS Right 2024    Procedure: ORIF, FRACTURE, RADIUS, DISTAL, RIGHT;  Surgeon: Bunny Monroe MD;  Location: [facility];  Service: Orthopedics;  Laterality: Right;  Supine, arm table, tourniquet  [employer] DVR   2nds case, ok for block    SHOULDER SURGERY Left     TUBAL LIGATION      WRIST SURGERY Left         Social History     Socioeconomic History  "   Marital status:    Tobacco Use    Smoking status: Former     Current packs/day: 0.00     Average packs/day: 2.0 packs/day for 44.8 years (89.6 ttl pk-yrs)     Types: Cigarettes     Start date: 1970     Quit date: 2015     Years since quittin.1    Smokeless tobacco: Never   Substance and Sexual Activity    Alcohol use: Never    Drug use: Never     Types: Marijuana    Sexual activity: Not Currently     Partners: Male     Birth control/protection: None        Family History   Problem Relation Name Age of Onset    No Known Problems Mother      Heart attack Father      Cancer Sister Edel Garcia              Hyperlipidemia Sister Edel Garcia     Hypertension Sister Edel Garcia     Hypertension Brother Roberto Carlos Simon     Cancer Brother Roberto Carlos Simon     COPD Brother Roberto Carlos Simon         Review of patient's allergies indicates:   Allergen Reactions    Shrimp Anaphylaxis    Hydromorphone      Other reaction(s): nausea/vomiting, Other (See Comments)  Pt. States that it cause dizziness      Shellfish containing products      Other reaction(s): hives/can't breathe    Meperidine Nausea Only     Other reaction(s): Memory loss/dyspepsia    Oxycodone-acetaminophen Nausea Only     Other reaction(s): nausea/vomiting  Patient is not truely allergic to medication. Has requested [place] patient nauseous          Immunization History   Administered Date(s) Administered    Hepatitis A / Hepatitis B 2022, 2022, 2023    Tdap 2016        ROS       Objective:      /71   Pulse 98   Temp 97.7 °F (36.5 °C)   Resp 16   Ht 5' 5" (1.651 m)   Wt 56.5 kg (124 lb 10.7 oz)   LMP  (LMP Unknown)   BMI 20.75 kg/m²      Physical Exam     Labs:   Lab Results   Component Value Date    WBC 5.86 2024    HGB 13.8 2024    HCT 41.2 2024    MCV 88.2 2024     2024       CMP  Sodium   Date Value Ref Range Status   2024 139 136 - 145 mmol/L " Final     Potassium   Date Value Ref Range Status   05/06/2024 4.1 3.5 - 5.1 mmol/L Final     CO2   Date Value Ref Range Status   05/06/2024 25 23 - 31 mmol/L Final     Blood Urea Nitrogen   Date Value Ref Range Status   05/06/2024 12.7 9.8 - 20.1 mg/dL Final     Creatinine   Date Value Ref Range Status   05/06/2024 0.73 0.55 - 1.02 mg/dL Final     Calcium   Date Value Ref Range Status   05/06/2024 9.7 8.4 - 10.2 mg/dL Final     Albumin   Date Value Ref Range Status   05/06/2024 3.9 3.4 - 4.8 g/dL Final     Bilirubin Total   Date Value Ref Range Status   05/06/2024 0.4 <=1.5 mg/dL Final     ALP   Date Value Ref Range Status   05/06/2024 79 40 - 150 unit/L Final     AST   Date Value Ref Range Status   05/06/2024 18 5 - 34 unit/L Final     ALT   Date Value Ref Range Status   05/06/2024 15 0 - 55 unit/L Final     eGFR   Date Value Ref Range Status   05/06/2024 >60 mls/min/1.73/m2 Final     Lab Results   Component Value Date    TSH 1.058 08/26/2023     Hep C Ab Interp   Date Value Ref Range Status   07/21/2022 Nonreactive Nonreactive Final     Cholesterol Total   Date Value Ref Range Status   08/26/2023 203 (H) <=200 mg/dL Final     HDL Cholesterol   Date Value Ref Range Status   08/26/2023 54 35 - 60 mg/dL Final     Triglyceride   Date Value Ref Range Status   08/26/2023 77 37 - 140 mg/dL Final     Cholesterol/HDL Ratio   Date Value Ref Range Status   08/26/2023 4 0 - 5 Final     Very Low Density Lipoprotein   Date Value Ref Range Status   08/26/2023 15  Final     LDL Cholesterol   Date Value Ref Range Status   08/26/2023 134.00 50.00 - 140.00 mg/dL Final     No results found for this or any previous visit.  No results found for this or any previous visit.  No results found for this or any previous visit.  No results found for this or any previous visit.  Results for orders placed or performed in visit on 11/29/17   Urinalysis   Result Value Ref Range    Color, UA Yellow >Yellow    Appearance, UA Clear >Clear     Specific Gravity,UA 1.015 1.000 - 1.032    pH, UA 6.5 5.0 - 9.0    Glucose, UA Negative >Negative    Protein, UA Negative >Negative    Occult Blood UA Trace-intact     Ketones, UA Negative >Negative    Bilirubin (UA) Negative >Negative    Urobilinogen, UA 0.2     Leukocytes, UA Trace (A) >Negative    RBC, UA 0-2 >0 - 2 /HPF    Squam Epithel, UA Occasional (A) >None-Rare    Nitrites, UA Negative     WBC, UA 2-4 >0 - 2 /HPF    Bacteria, UA Occasional (A) >None Seen /HPF       Imaging: Reviewed most recent relevant imaging studies available, notable results highlighted in this note    Medications:     Current Outpatient Medications   Medication Instructions    acetaminophen (TYLENOL) 500 mg, Oral, Every 6 hours PRN    doxycycline (MONODOX) 100 mg, Oral, Every 12 hours    ibuprofen (ADVIL,MOTRIN) 800 mg, Oral, Every 6 hours PRN    tiotropium bromide (SPIRIVA RESPIMAT) 1.25 mcg/actuation inhaler Inhalation       Assessment:       Problem List Items Addressed This Visit    None  Visit Diagnoses       Chronic osteomyelitis involving forearm, left    -  Primary    Relevant Medications    doxycycline (MONODOX) 100 MG capsule    Other Relevant Orders    CBC Auto Differential    Comprehensive Metabolic Panel    C-Reactive Protein    Sedimentation rate    Chronic antibiotic suppression        Relevant Medications    doxycycline (MONODOX) 100 MG capsule    Screening mammogram, encounter for        Relevant Orders    Mammo Digital Screening Bilat               Plan:      Chronic osteomyelitis involving forearm, left  -     doxycycline (MONODOX) 100 MG capsule; Take 1 capsule (100 mg total) by mouth every 12 (twelve) hours.  Dispense: 60 capsule; Refill: 6  -     CBC Auto Differential; Future; Expected date: 10/01/2024  -     Comprehensive Metabolic Panel; Future; Expected date: 10/01/2024  -     C-Reactive Protein; Future; Expected date: 10/01/2024  -     Sedimentation rate; Future; Expected date: 10/01/2024  Continue Doxycycline  100 mg 1 po BID   RTC 6 months with Gaby for in office visit  Repeat labs 1 week before next visit     Vaccines recommended. Pt is not amenable to vaccines    Chronic antibiotic suppression  -     doxycycline (MONODOX) 100 MG capsule; Take 1 capsule (100 mg total) by mouth every 12 (twelve) hours.  Dispense: 60 capsule; Refill: 6  Continue Doxycycline 100 mg 1 po BID   Risk vs benefits discussed with patient along with a/e's. Pt is to remain upright for at lease 30 minutes to prevent pill esophagitis and will need sun protection d/t photosensitivity with medication (pt will need to avoid direct sun exposure, wear sunscreen, wear sunglasses, wear long sleeves/skin protection, etc).  Pt verbalized understanding and agrees with plan.     Screening mammogram, encounter for  -     Mammo Digital Screening Bilat; Future; Expected date: 05/09/2024    20 minutes of total time spent on the encounter, which includes face to face time and non-face to face time preparing to see the patient (eg, review of tests), Obtaining and/or reviewing separately obtained history, Documenting clinical information in the electronic or other health record, Independently interpreting results (not separately reported) and communicating results to the patient/family/caregiver, or Care coordination (not separately reported).

## 2024-06-10 DIAGNOSIS — Z78.0 MENOPAUSE PRESENT: Primary | ICD-10-CM

## 2024-06-25 NOTE — H&P (VIEW-ONLY)
From: Martin Anguiano Montanez  To: Dr. Jude Ramirez  Sent: 6/25/2024 3:00 PM EDT  Subject: Freestyle Anam 3    A while back i got you to send in a prescription for the Freestyle Anam 3. I did not get it then because of the price. I have decided to get it yet [facility] says you need to send in an order for the meter. I thought you did the first time but they say they can't find it. Thanks   Subjective:       Patient ID: Maryellen Velez is a 68 y.o. female.    Chief Complaint   Patient presents with    Right Wrist - Injury     Rt wrist 1/17/24 ER on same day. Radius/ulna Patient is here today in splint from ER. She is not in pain today.          Patient is here today for initial evaluation of injury sustained to her right wrist in a fall.  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  She has been treated for osteomyelitis of the left upper extremity.  She is currently got no signs or symptoms of infection is on doxycycline for suppressive antibiotic therapy.  Recent lab work showed that her ESR was within normal limits.    She was seen in the emergency department last week after a fall and underwent manipulation of her right distal radius fracture in his currently in a sugar-tong splint.  She has no complaints of any pain in the wrist today.  She is here today with her daughter to discuss operative stabilization.    Injury  Pertinent negatives include no abdominal pain, chest pain, chills, congestion, coughing, fever, nausea, neck pain, numbness or vomiting.       Review of Systems   Constitutional: Negative for chills, fever and malaise/fatigue.   HENT:  Negative for congestion and hearing loss.    Eyes:  Negative for visual disturbance.   Cardiovascular:  Negative for chest pain and syncope.   Respiratory:  Negative for cough and shortness of breath.    Hematologic/Lymphatic: Does not bruise/bleed easily.   Skin:  Negative for color change and suspicious lesions.   Musculoskeletal:  Negative for falls and neck pain.   Gastrointestinal:  Negative for abdominal pain, nausea and vomiting.   Genitourinary:  Negative for dysuria and hematuria.   Neurological:  Negative for numbness and sensory change.  "  Psychiatric/Behavioral:  Negative for altered mental status. The patient is not nervous/anxious.         Current Outpatient Medications on File Prior to Visit   Medication Sig Dispense Refill    acetaminophen (TYLENOL) 500 MG tablet Take 500 mg by mouth every 6 (six) hours as needed.      budesonide-formoterol 160-4.5 mcg (SYMBICORT) 160-4.5 mcg/actuation HFAA Inhale into the lungs.      doxycycline (MONODOX) 100 MG capsule Take 1 capsule (100 mg total) by mouth every 12 (twelve) hours. 60 capsule 4    HYDROcodone-acetaminophen (NORCO) 5-325 mg per tablet Take 1 tablet by mouth every 6 (six) hours as needed for Pain. 12 tablet 0    ondansetron (ZOFRAN-ODT) 4 MG TbDL Take 1 tablet (4 mg total) by mouth every 6 (six) hours as needed (nausea). 12 tablet 0    promethazine (PHENERGAN) 12.5 MG Tab Take 1 tablet (12.5 mg total) by mouth every 6 (six) hours as needed (nausea not relieved by zofran). 12 tablet 0    tiotropium bromide (SPIRIVA RESPIMAT) 1.25 mcg/actuation inhaler Inhale into the lungs.      pravastatin (PRAVACHOL) 40 MG tablet        No current facility-administered medications on file prior to visit.          Objective:      BP (!) 185/101 (BP Location: Left leg, Patient Position: Sitting, BP Method: Large (Automatic))   Pulse 104   Ht 5' 5" (1.651 m)   Wt 55.8 kg (123 lb)   LMP  (LMP Unknown)   BMI 20.47 kg/m²   Physical Exam  Constitutional:       General: She is not in acute distress.     Appearance: Normal appearance. She is not ill-appearing.   HENT:      Head: Normocephalic and atraumatic.      Nose: No congestion.   Eyes:      Extraocular Movements: Extraocular movements intact.   Cardiovascular:      Rate and Rhythm: Normal rate and regular rhythm.      Pulses: Normal pulses.   Pulmonary:      Effort: Pulmonary effort is normal.      Breath sounds: Normal breath sounds.   Abdominal:      General: There is no distension.      Palpations: Abdomen is soft.      Tenderness: There is no abdominal " tenderness.   Musculoskeletal:      Comments: Right upper extremity:  Sugar-tong splint in place.  Well padded.  Intact EPL/FPL, EDC/FDP and interossei.  Her sensation light touch is intact distally.  She is brisk capillary refill to her digits.  No evidence of any median nerve compression.   Skin:     General: Skin is warm and dry.   Neurological:      Mental Status: She is alert and oriented to person, place, and time. Mental status is at baseline.   Psychiatric:         Mood and Affect: Mood normal.         Behavior: Behavior normal.         Thought Content: Thought content normal.         Judgment: Judgment normal.        Body mass index is 20.47 kg/m².    Radiology:   X-rays of the right wrist, three views from the emergency department:  Displaced intra-articular fracture of the right distal radius with a nondisplaced ulnar styloid fracture.      Assessment:         1. Other intraarticular fracture of lower end of right radius, initial encounter for closed fracture  Place in Outpatient    Case Request Operating Room: ORIF, FRACTURE, RADIUS, DISTAL, RIGHT    Vital signs    Insert peripheral IV    Verify informed consent    Diet NPO    Place JACOB hose    Place sequential compression device    Full code    Insert peripheral IV    Verify informed consent    Diet NPO    Place JACOB hose    Place sequential compression device      2. Other closed intra-articular fracture of distal end of right radius, initial encounter                Plan:       The risks, benefits and alternatives treatment were discussed at length with the patient today including but not limited to pain, bleeding, scarring, infection, damage to neurovascular structures, malunion/nonunion, hardware failure/irritation, need for future procedures and complications leading to amputation and even death.  She will benefit from operative stabilization of her right distal radius fracture.  I will plan to take her to the operating room tomorrow for open  reduction and internal fixation.  She will need to remain nonweightbearing to the right upper extremity.  Keep splint clean and dry.  Keep limb elevated while at rest.  She and her daughter understand and agree with all that we have discussed and all questions and concerns were addressed today.    This note/OR report was created with the assistance of  voice recognition software or phone  dictation.  There may be transcription errors as a result of using this technology however minimal. Effort has been made to assure accuracy of transcription but any obvious errors or omissions should be clarified with the author of the document.         Bunny Monroe MD  Orthopedic Trauma  [facility]      No follow-ups on file.    Other intraarticular fracture of lower end of right radius, initial encounter for closed fracture  -     Place in Outpatient; Standing  -     Case Request Operating Room: ORIF, FRACTURE, RADIUS, DISTAL, RIGHT  -     Vital signs; Standing  -     Insert peripheral IV; Standing  -     Verify informed consent; Standing  -     Diet NPO; Standing  -     Place JACOB hose; Standing  -     Place sequential compression device; Standing  -     Full code; Standing    Other closed intra-articular fracture of distal end of right radius, initial encounter    Other orders  -     ceFAZolin (ANCEF) 2 g in dextrose 5 % (D5W) 50 mL IVPB  -     mupirocin 2 % ointment              Orders Placed This Encounter   Procedures    Diet NPO     Specify start time     Standing Status:   Standing     Number of Occurrences:   1    Verify informed consent     Standing Status:   Standing     Number of Occurrences:   1    Place JACOB hose     Standing Status:   Standing     Number of Occurrences:   1    Place sequential compression device     Standing Status:   Standing     Number of Occurrences:   1    Insert peripheral IV     Standing Status:   Standing     Number of Occurrences:   1    Case Request Operating Room: ORIF,  FRACTURE, RADIUS, DISTAL, RIGHT     Order Specific Question:   Medical Necessity:     Answer:   Medically Urgent [101]     Order Specific Question:   CPT Code:     Answer:   VT OPEN RX DISTAL RADIUS FX, INTRA-ARTICULAR, 3+ FRAG [70506]     Order Specific Question:   Is an on-site pathologist required for this procedure?     Answer:   N/A       Future Appointments   Date Time Provider Department Center   1/25/2024  2:45 PM Bunny Monroe MD [facility] [place]   5/9/2024  8:30 AM Gaby Zimmerman FNP [facility] [place]

## 2024-07-17 ENCOUNTER — HOSPITAL ENCOUNTER (OUTPATIENT)
Dept: RADIOLOGY | Facility: CLINIC | Age: 69
Discharge: HOME | End: 2024-07-17
Attending: ORTHOPAEDIC SURGERY
Payer: MEDICARE

## 2024-07-17 ENCOUNTER — OFFICE VISIT (OUTPATIENT)
Dept: ORTHOPEDICS | Facility: CLINIC | Age: 69
End: 2024-07-17
Payer: MEDICARE

## 2024-07-17 VITALS
WEIGHT: 124.56 LBS | SYSTOLIC BLOOD PRESSURE: 129 MMHG | HEIGHT: 65 IN | DIASTOLIC BLOOD PRESSURE: 78 MMHG | BODY MASS INDEX: 20.75 KG/M2 | HEART RATE: 96 BPM

## 2024-07-17 DIAGNOSIS — S52.571D CLOSED DIE PUNCH FRACTURE OF DISTAL RADIUS, RIGHT, WITH ROUTINE HEALING, SUBSEQUENT ENCOUNTER: ICD-10-CM

## 2024-07-17 DIAGNOSIS — S52.571D CLOSED DIE PUNCH FRACTURE OF DISTAL RADIUS, RIGHT, WITH ROUTINE HEALING, SUBSEQUENT ENCOUNTER: Primary | ICD-10-CM

## 2024-07-17 PROCEDURE — 99213 OFFICE O/P EST LOW 20 MIN: CPT | Mod: ,,, | Performed by: ORTHOPAEDIC SURGERY

## 2024-07-17 PROCEDURE — 3008F BODY MASS INDEX DOCD: CPT | Mod: CPTII,,, | Performed by: ORTHOPAEDIC SURGERY

## 2024-07-17 PROCEDURE — 73110 X-RAY EXAM OF WRIST: CPT | Mod: RT,,, | Performed by: ORTHOPAEDIC SURGERY

## 2024-07-17 PROCEDURE — 3074F SYST BP LT 130 MM HG: CPT | Mod: CPTII,,, | Performed by: ORTHOPAEDIC SURGERY

## 2024-07-17 PROCEDURE — 1160F RVW MEDS BY RX/DR IN RCRD: CPT | Mod: CPTII,,, | Performed by: ORTHOPAEDIC SURGERY

## 2024-07-17 PROCEDURE — 3078F DIAST BP <80 MM HG: CPT | Mod: CPTII,,, | Performed by: ORTHOPAEDIC SURGERY

## 2024-07-17 PROCEDURE — 1159F MED LIST DOCD IN RCRD: CPT | Mod: CPTII,,, | Performed by: ORTHOPAEDIC SURGERY

## 2024-07-17 PROCEDURE — 1101F PT FALLS ASSESS-DOCD LE1/YR: CPT | Mod: CPTII,,, | Performed by: ORTHOPAEDIC SURGERY

## 2024-07-17 PROCEDURE — 3288F FALL RISK ASSESSMENT DOCD: CPT | Mod: CPTII,,, | Performed by: ORTHOPAEDIC SURGERY

## 2024-07-17 NOTE — PROGRESS NOTES
Subjective:       Patient ID: Maryellen Velez is a 69 y.o. female.    Chief Complaint   Patient presents with    Follow-up     5.5 mos, Rt distal radius ORIF sx 1/26/24, reports minimal pain at times, states some improvement, no other complaints         Patient here today for follow-up evaluation status post open reduction internal fixation of right distal radius fracture.  She is doing very well.  She has been working on range motion and strengthening exercises after being released from physical therapy.  She has some prominence over the ulnar border of the wrist but no significant pain or limitations in range motion.    Follow-up  Pertinent negatives include no abdominal pain, chest pain, chills, congestion, coughing, fever, nausea, neck pain, numbness or vomiting.       Review of Systems   Constitutional: Negative for chills, fever and malaise/fatigue.   HENT:  Negative for congestion and hearing loss.    Eyes:  Negative for visual disturbance.   Cardiovascular:  Negative for chest pain and syncope.   Respiratory:  Negative for cough and shortness of breath.    Hematologic/Lymphatic: Does not bruise/bleed easily.   Skin:  Negative for color change and suspicious lesions.   Musculoskeletal:  Negative for falls and neck pain.   Gastrointestinal:  Negative for abdominal pain, nausea and vomiting.   Genitourinary:  Negative for dysuria and hematuria.   Neurological:  Negative for numbness and sensory change.   Psychiatric/Behavioral:  Negative for altered mental status. The patient is not nervous/anxious.         Current Outpatient Medications on File Prior to Visit   Medication Sig Dispense Refill    acetaminophen (TYLENOL) 500 MG tablet Take 500 mg by mouth every 6 (six) hours as needed.      doxycycline (MONODOX) 100 MG capsule Take 1 capsule (100 mg total) by mouth every 12 (twelve) hours. 60 capsule 6    ibuprofen (ADVIL,MOTRIN) 800 MG tablet Take 800 mg by mouth every 6 (six) hours as needed for Pain.       "tiotropium bromide (SPIRIVA RESPIMAT) 1.25 mcg/actuation inhaler Inhale into the lungs.       No current facility-administered medications on file prior to visit.          Objective:      /78   Pulse 96   Ht 5' 5" (1.651 m)   Wt 56.5 kg (124 lb 9 oz)   LMP  (LMP Unknown)   BMI 20.73 kg/m²   Physical Exam  Constitutional:       General: She is not in acute distress.     Appearance: Normal appearance. She is not ill-appearing.   HENT:      Head: Normocephalic and atraumatic.      Nose: No congestion.   Eyes:      Extraocular Movements: Extraocular movements intact.   Cardiovascular:      Rate and Rhythm: Normal rate and regular rhythm.      Pulses: Normal pulses.   Pulmonary:      Effort: Pulmonary effort is normal.      Breath sounds: Normal breath sounds.   Abdominal:      General: There is no distension.      Palpations: Abdomen is soft.      Tenderness: There is no abdominal tenderness.   Musculoskeletal:      Comments: Right upper extremity: Surgical incision is well healed.  No painful or prominent hardware.  Intact EPL/FPL, EDC/FDP and interossei.  She has full supination and pronation equal compared to the opposite extremity.  No limitations in range motion with radial and ulnar deviation.  No clicking or catching noted.   Skin:     General: Skin is warm and dry.   Neurological:      Mental Status: She is alert and oriented to person, place, and time. Mental status is at baseline.   Psychiatric:         Mood and Affect: Mood normal.         Behavior: Behavior normal.         Thought Content: Thought content normal.         Judgment: Judgment normal.        Body mass index is 20.73 kg/m².    Radiology:   Right wrist three views:  Hardware intact.  Alignment maintained.  Fracture completely consolidated.      Assessment:         1. Closed die punch fracture of distal radius, right, with routine healing, subsequent encounter  X-Ray Wrist Complete Right              Plan:       She is doing great today " and I am very happy with her progress.  She can continue full active use of the right upper extremity without restrictions.  She has no subluxation of her DRUJ, no dorsal prominence of the distal ulna.  Her fracture is completely consolidated.  She will follow up with me on an as-needed basis should any new issues arise for her in the future.  She is happy with this plan of care today and all questions and concerns were addressed.      This note/OR report was created with the assistance of  voice recognition software or phone  dictation.  There may be transcription errors as a result of using this technology however minimal. Effort has been made to assure accuracy of transcription but any obvious errors or omissions should be clarified with the author of the document.       Bunny Monroe MD  Orthopedic Trauma  [facility]      Follow up if symptoms worsen or fail to improve.    Closed die punch fracture of distal radius, right, with routine healing, subsequent encounter  -     X-Ray Wrist Complete Right; Future; Expected date: 07/17/2024              Orders Placed This Encounter   Procedures    X-Ray Wrist Complete Right     Standing Status:   Future     Number of Occurrences:   1     Standing Expiration Date:   7/16/2025     Order Specific Question:   May the Radiologist modify the order per protocol to meet the clinical needs of the patient?     Answer:   Yes     Order Specific Question:   Release to patient     Answer:   Immediate       Future Appointments   Date Time Provider Department Center   9/11/2024  9:00 AM [facility] [id number] [facility] MAMMO [facility]   9/11/2024  9:30 AM [facility] [id number] 300 LB LIMIT [facility] XRAY [facility]   11/14/2024  8:30 AM Gaby Zimmerman, ELIAS [facility]

## 2024-09-23 ENCOUNTER — HOSPITAL ENCOUNTER (OUTPATIENT)
Dept: RADIOLOGY | Facility: HOSPITAL | Age: 69
Discharge: HOME | End: 2024-09-23
Attending: NURSE PRACTITIONER
Payer: MEDICARE

## 2024-09-23 DIAGNOSIS — Z12.31 SCREENING MAMMOGRAM, ENCOUNTER FOR: ICD-10-CM

## 2024-09-23 DIAGNOSIS — Z78.0 MENOPAUSE PRESENT: ICD-10-CM

## 2024-09-23 PROCEDURE — 77067 SCR MAMMO BI INCL CAD: CPT | Mod: TC

## 2024-09-23 PROCEDURE — 77063 BREAST TOMOSYNTHESIS BI: CPT | Mod: TC

## 2024-09-23 PROCEDURE — 77063 BREAST TOMOSYNTHESIS BI: CPT | Mod: 26,,, | Performed by: RADIOLOGY

## 2024-09-23 PROCEDURE — 77080 DXA BONE DENSITY AXIAL: CPT | Mod: TC

## 2024-09-23 PROCEDURE — 77067 SCR MAMMO BI INCL CAD: CPT | Mod: 26,,, | Performed by: RADIOLOGY

## 2024-09-24 NOTE — PROGRESS NOTES
Reviewed BMD which shows osteoporosis.  This was ordered by Dr. Elias. I contacted the patient with results and explained that she will need to start taking a biphosphonate to increase bone density. Pt states she will reach out to Dr. Elias.

## 2024-11-08 ENCOUNTER — LAB VISIT (OUTPATIENT)
Dept: LAB | Facility: HOSPITAL | Age: 69
End: 2024-11-08
Attending: NURSE PRACTITIONER
Payer: MEDICARE

## 2024-11-08 DIAGNOSIS — M86.632 CHRONIC OSTEOMYELITIS INVOLVING FOREARM, LEFT: ICD-10-CM

## 2024-11-08 DIAGNOSIS — Z87.891 PERSONAL HISTORY OF TOBACCO USE, PRESENTING HAZARDS TO HEALTH: Primary | ICD-10-CM

## 2024-11-08 LAB
ALBUMIN SERPL-MCNC: 3.8 G/DL (ref 3.4–4.8)
ALBUMIN/GLOB SERPL: 1.2 RATIO (ref 1.1–2)
ALP SERPL-CCNC: 90 UNIT/L (ref 40–150)
ALT SERPL-CCNC: 12 UNIT/L (ref 0–55)
ANION GAP SERPL CALC-SCNC: 7 MEQ/L
AST SERPL-CCNC: 15 UNIT/L (ref 5–34)
BASOPHILS # BLD AUTO: 0.03 X10(3)/MCL
BASOPHILS NFR BLD AUTO: 0.4 %
BILIRUB SERPL-MCNC: 0.5 MG/DL
BUN SERPL-MCNC: 13.2 MG/DL (ref 9.8–20.1)
CALCIUM SERPL-MCNC: 9.7 MG/DL (ref 8.4–10.2)
CHLORIDE SERPL-SCNC: 110 MMOL/L (ref 98–107)
CO2 SERPL-SCNC: 25 MMOL/L (ref 23–31)
CREAT SERPL-MCNC: 0.81 MG/DL (ref 0.55–1.02)
CREAT/UREA NIT SERPL: 16
CRP SERPL-MCNC: 3 MG/L
EOSINOPHIL # BLD AUTO: 0.25 X10(3)/MCL (ref 0–0.9)
EOSINOPHIL NFR BLD AUTO: 3.6 %
ERYTHROCYTE [DISTWIDTH] IN BLOOD BY AUTOMATED COUNT: 13.2 % (ref 11.5–17)
ERYTHROCYTE [SEDIMENTATION RATE] IN BLOOD: 19 MM/HR (ref 0–20)
GFR SERPLBLD CREATININE-BSD FMLA CKD-EPI: >60 ML/MIN/1.73/M2
GLOBULIN SER-MCNC: 3.1 GM/DL (ref 2.4–3.5)
GLUCOSE SERPL-MCNC: 84 MG/DL (ref 82–115)
HCT VFR BLD AUTO: 40.9 % (ref 37–47)
HGB BLD-MCNC: 13.5 G/DL (ref 12–16)
IMM GRANULOCYTES # BLD AUTO: 0.01 X10(3)/MCL (ref 0–0.04)
IMM GRANULOCYTES NFR BLD AUTO: 0.1 %
LYMPHOCYTES # BLD AUTO: 1.66 X10(3)/MCL (ref 0.6–4.6)
LYMPHOCYTES NFR BLD AUTO: 23.6 %
MCH RBC QN AUTO: 29.4 PG (ref 27–31)
MCHC RBC AUTO-ENTMCNC: 33 G/DL (ref 33–36)
MCV RBC AUTO: 89.1 FL (ref 80–94)
MONOCYTES # BLD AUTO: 0.66 X10(3)/MCL (ref 0.1–1.3)
MONOCYTES NFR BLD AUTO: 9.4 %
NEUTROPHILS # BLD AUTO: 4.42 X10(3)/MCL (ref 2.1–9.2)
NEUTROPHILS NFR BLD AUTO: 62.9 %
PLATELET # BLD AUTO: 232 X10(3)/MCL (ref 130–400)
PMV BLD AUTO: 9 FL (ref 7.4–10.4)
POTASSIUM SERPL-SCNC: 4.9 MMOL/L (ref 3.5–5.1)
PROT SERPL-MCNC: 6.9 GM/DL (ref 5.8–7.6)
RBC # BLD AUTO: 4.59 X10(6)/MCL (ref 4.2–5.4)
SODIUM SERPL-SCNC: 142 MMOL/L (ref 136–145)
WBC # BLD AUTO: 7.03 X10(3)/MCL (ref 4.5–11.5)

## 2024-11-08 PROCEDURE — 86140 C-REACTIVE PROTEIN: CPT

## 2024-11-08 PROCEDURE — 85652 RBC SED RATE AUTOMATED: CPT

## 2024-11-08 PROCEDURE — 36415 COLL VENOUS BLD VENIPUNCTURE: CPT

## 2024-11-08 PROCEDURE — 85025 COMPLETE CBC W/AUTO DIFF WBC: CPT

## 2024-11-08 PROCEDURE — 80053 COMPREHEN METABOLIC PANEL: CPT

## 2024-11-14 ENCOUNTER — OFFICE VISIT (OUTPATIENT)
Dept: INFECTIOUS DISEASES | Facility: CLINIC | Age: 69
End: 2024-11-14
Payer: MEDICARE

## 2024-11-14 VITALS
DIASTOLIC BLOOD PRESSURE: 79 MMHG | RESPIRATION RATE: 16 BRPM | HEART RATE: 102 BPM | HEIGHT: 65 IN | WEIGHT: 124.31 LBS | TEMPERATURE: 98 F | SYSTOLIC BLOOD PRESSURE: 121 MMHG | BODY MASS INDEX: 20.71 KG/M2

## 2024-11-14 DIAGNOSIS — Z79.2 CHRONIC ANTIBIOTIC SUPPRESSION: ICD-10-CM

## 2024-11-14 DIAGNOSIS — M86.632: Primary | ICD-10-CM

## 2024-11-14 DIAGNOSIS — M86.632 CHRONIC OSTEOMYELITIS INVOLVING FOREARM, LEFT: ICD-10-CM

## 2024-11-14 PROCEDURE — 99214 OFFICE O/P EST MOD 30 MIN: CPT | Mod: PBBFAC | Performed by: NURSE PRACTITIONER

## 2024-11-14 RX ORDER — BUDESONIDE AND FORMOTEROL FUMARATE DIHYDRATE 160; 4.5 UG/1; UG/1
AEROSOL RESPIRATORY (INHALATION)
COMMUNITY
Start: 2024-06-06

## 2024-11-14 RX ORDER — DOXYCYCLINE 100 MG/1
100 CAPSULE ORAL EVERY 12 HOURS
Qty: 60 CAPSULE | Refills: 6 | Status: SHIPPED | OUTPATIENT
Start: 2024-11-14

## 2024-11-14 NOTE — PROGRESS NOTES
Patient ID: Maryellen Velez 69 y.o.     Chief Complaint:   Chief Complaint   Patient presents with    Follow-up     osteo        HPI:    Ms. Velez is a 70 y/o WF here for follow up of chronic left forearm osteomyelitis. Hx of past ulnar fx  with MRSA positive culture 2016 and bone bx culture + for MRSA 18. Pt completed 6 weeks of Daptomycin 6mg/kg IV daily 10/26/18.  She was then started on Doxycycline 100mg 1 po BID lifelong. Pt endorses she is tolerating the meds well. She complains of some minor pain to the left arm and shoulder area. She thinks it is related to the weather change or they way she slept on it. I offered to get a x ray. However, patient is not amenable to an x ray. She denies fever, chills, headaches, visual problems, N/V/D, SOB, cough, abdominal pain, drainage/erythema/warmth from the healed surgical site. Reviewed labs from 24 CRP 3.00 and sed rate 19.  Pt is due for several vaccines, but is not interested in any at this time. PCP is Dr. Elias in [place]. Cervical pap was done by Dr. Elias. Colonoscopy  in [place] by Dr. Childs. Last mammogram and bone density scan 24.          Past Medical History:   Diagnosis Date    COPD (chronic obstructive pulmonary disease)     Hyperlipidemia     Personal history of colonic polyps 2017    Outside Records        Past Surgical History:   Procedure Laterality Date     SECTION      COLONOSCOPY  2017    Outside Records    ELBOW SURGERY Left     FRACTURE SURGERY  May 2016    NECK SURGERY      OPEN REDUCTION AND INTERNAL FIXATION (ORIF) OF FRACTURE OF DISTAL RADIUS Right 2024    Procedure: ORIF, FRACTURE, RADIUS, DISTAL, RIGHT;  Surgeon: Bunny Monroe MD;  Location: [facility];  Service: Orthopedics;  Laterality: Right;  Supine, arm table, tourniquet  [employer] DVR   2nds case, ok for block    SHOULDER SURGERY Left     TUBAL LIGATION      WRIST SURGERY Left         Social History      Socioeconomic History    Marital status:    Tobacco Use    Smoking status: Former     Current packs/day: 0.00     Average packs/day: 2.0 packs/day for 44.8 years (89.7 ttl pk-yrs)     Types: Cigarettes     Start date: 1970     Quit date: 2015     Years since quittin.6    Smokeless tobacco: Never   Substance and Sexual Activity    Alcohol use: Never    Drug use: Never     Types: Marijuana    Sexual activity: Not Currently     Partners: Male     Birth control/protection: None        Family History   Problem Relation Name Age of Onset    No Known Problems Mother      Heart attack Father      Cancer Sister Edel Garcia              Hyperlipidemia Sister Edel Garcia     Hypertension Sister Edel Garcia     Hypertension Brother Roberto Carlos Simon     Cancer Brother Roberto Carlos Simon     COPD Brother Roberto Carlos Simon         Review of patient's allergies indicates:   Allergen Reactions    Shrimp Anaphylaxis    Hydromorphone      Other reaction(s): nausea/vomiting, Other (See Comments)  Pt. States that it cause dizziness      Shellfish containing products      Other reaction(s): hives/can't breathe    Meperidine Nausea Only     Other reaction(s): Memory loss/dyspepsia    Oxycodone-acetaminophen Nausea Only     Other reaction(s): nausea/vomiting  Patient is not truely allergic to medication. Has requested Norcomakes patient nauseous          Immunization History   Administered Date(s) Administered    Hepatitis A / Hepatitis B 2022, 2022, 2023    Tdap 2016        Review of Systems   Constitutional: Negative.    HENT: Negative.     Eyes: Negative.    Respiratory: Negative.     Cardiovascular: Negative.    Gastrointestinal: Negative.    Genitourinary: Negative.    Musculoskeletal: Negative.    Skin: Negative.    Neurological: Negative.    Endo/Heme/Allergies: Negative.    Psychiatric/Behavioral: Negative.     All other systems reviewed and are negative.         Objective:  "     /79   Pulse 102   Temp 97.6 °F (36.4 °C) (Oral)   Resp 16   Ht 5' 5" (1.651 m)   Wt 56.4 kg (124 lb 5.4 oz)   LMP  (LMP Unknown)   BMI 20.69 kg/m²      Physical Exam  Vitals reviewed.   Constitutional:       General: She is not in acute distress.     Appearance: Normal appearance.   HENT:      Head: Normocephalic.      Right Ear: External ear normal.      Left Ear: External ear normal.      Nose: Nose normal.      Mouth/Throat:      Mouth: Mucous membranes are moist.      Pharynx: Oropharynx is clear.   Eyes:      General: No scleral icterus.     Extraocular Movements: Extraocular movements intact.      Conjunctiva/sclera: Conjunctivae normal.      Pupils: Pupils are equal, round, and reactive to light.   Cardiovascular:      Rate and Rhythm: Normal rate and regular rhythm.      Pulses: Normal pulses.      Heart sounds: Normal heart sounds.   Pulmonary:      Effort: Pulmonary effort is normal. No respiratory distress.      Breath sounds: Normal breath sounds.   Abdominal:      General: Bowel sounds are normal. There is no distension.      Palpations: Abdomen is soft. There is no mass.      Tenderness: There is no abdominal tenderness. There is no right CVA tenderness or left CVA tenderness.      Hernia: No hernia is present.   Musculoskeletal:         General: No tenderness or signs of injury. Normal range of motion.      Cervical back: Normal range of motion and neck supple.      Right lower leg: No edema.      Left lower leg: No edema.   Lymphadenopathy:      Cervical: No cervical adenopathy.   Skin:     General: Skin is warm and dry.      Capillary Refill: Capillary refill takes less than 2 seconds.      Findings: No erythema or lesion.   Neurological:      General: No focal deficit present.      Mental Status: She is alert and oriented to person, place, and time. Mental status is at baseline.   Psychiatric:         Mood and Affect: Mood normal.         Behavior: Behavior normal.         Thought " Content: Thought content normal.         Judgment: Judgment normal.          Labs:   Lab Results   Component Value Date    WBC 7.03 11/08/2024    HGB 13.5 11/08/2024    HCT 40.9 11/08/2024    MCV 89.1 11/08/2024     11/08/2024       CMP  Sodium   Date Value Ref Range Status   11/08/2024 142 136 - 145 mmol/L Final     Potassium   Date Value Ref Range Status   11/08/2024 4.9 3.5 - 5.1 mmol/L Final     Chloride   Date Value Ref Range Status   11/08/2024 110 (H) 98 - 107 mmol/L Final     CO2   Date Value Ref Range Status   11/08/2024 25 23 - 31 mmol/L Final     Blood Urea Nitrogen   Date Value Ref Range Status   11/08/2024 13.2 9.8 - 20.1 mg/dL Final     Creatinine   Date Value Ref Range Status   11/08/2024 0.81 0.55 - 1.02 mg/dL Final     Calcium   Date Value Ref Range Status   11/08/2024 9.7 8.4 - 10.2 mg/dL Final     Albumin   Date Value Ref Range Status   11/08/2024 3.8 3.4 - 4.8 g/dL Final     Bilirubin Total   Date Value Ref Range Status   11/08/2024 0.5 <=1.5 mg/dL Final     ALP   Date Value Ref Range Status   11/08/2024 90 40 - 150 unit/L Final     AST   Date Value Ref Range Status   11/08/2024 15 5 - 34 unit/L Final     ALT   Date Value Ref Range Status   11/08/2024 12 0 - 55 unit/L Final     eGFR   Date Value Ref Range Status   11/08/2024 >60 mL/min/1.73/m2 Final     Lab Results   Component Value Date    TSH 1.058 08/26/2023     Hep C Ab Interp   Date Value Ref Range Status   07/21/2022 Nonreactive Nonreactive Final     Cholesterol Total   Date Value Ref Range Status   08/26/2023 203 (H) <=200 mg/dL Final     HDL Cholesterol   Date Value Ref Range Status   08/26/2023 54 35 - 60 mg/dL Final     Triglyceride   Date Value Ref Range Status   08/26/2023 77 37 - 140 mg/dL Final     Cholesterol/HDL Ratio   Date Value Ref Range Status   08/26/2023 4 0 - 5 Final     Very Low Density Lipoprotein   Date Value Ref Range Status   08/26/2023 15  Final     LDL Cholesterol   Date Value Ref Range Status   08/26/2023  134.00 50.00 - 140.00 mg/dL Final     No results found for this or any previous visit.  No results found for this or any previous visit.  No results found for this or any previous visit.  No results found for this or any previous visit.  Results for orders placed or performed in visit on 11/29/17   Urinalysis   Result Value Ref Range    Color, UA Yellow >Yellow    Appearance, UA Clear >Clear    Specific Gravity,UA 1.015 1.000 - 1.032    pH, UA 6.5 5.0 - 9.0    Glucose, UA Negative >Negative    Protein, UA Negative >Negative    Occult Blood UA Trace-intact     Ketones, UA Negative >Negative    Bilirubin (UA) Negative >Negative    Urobilinogen, UA 0.2     Leukocytes, UA Trace (A) >Negative    RBC, UA 0-2 >0 - 2 /HPF    Squam Epithel, UA Occasional (A) >None-Rare    Nitrites, UA Negative     WBC, UA 2-4 >0 - 2 /HPF    Bacteria, UA Occasional (A) >None Seen /HPF       Imaging: Reviewed most recent relevant imaging studies available, notable results highlighted in this note    Medications:     Current Outpatient Medications   Medication Instructions    acetaminophen (TYLENOL) 500 mg, Every 6 hours PRN    doxycycline (MONODOX) 100 mg, Oral, Every 12 hours    ibuprofen (ADVIL,MOTRIN) 800 mg, Every 6 hours PRN    SYMBICORT 160-4.5 mcg/actuation HFAA     tiotropium bromide (SPIRIVA RESPIMAT) 1.25 mcg/actuation inhaler Inhale into the lungs.       Assessment:       Problem List Items Addressed This Visit    None  Visit Diagnoses       Chronic osteomyelitis of left forearm    -  Primary    Relevant Medications    doxycycline (MONODOX) 100 MG capsule    Other Relevant Orders    CBC Auto Differential    Comprehensive Metabolic Panel    C-Reactive Protein    Sedimentation rate    Chronic antibiotic suppression        Relevant Medications    doxycycline (MONODOX) 100 MG capsule    Chronic osteomyelitis involving forearm, left                   Plan:      Chronic osteomyelitis of left forearm  -     doxycycline (MONODOX) 100 MG  capsule; Take 1 capsule (100 mg total) by mouth every 12 (twelve) hours.  Dispense: 60 capsule; Refill: 6  -     CBC Auto Differential; Future; Expected date: 05/14/2025  -     Comprehensive Metabolic Panel; Future; Expected date: 05/14/2025  -     C-Reactive Protein; Future; Expected date: 05/14/2025  -     Sedimentation rate; Future; Expected date: 05/14/2025  Continue Doxycycline 100 mg 1 po BID for chronic suppression   Labs 1 week before next visit   RTC 6 months with Gaby for an in office visit   Vaccines recommended. Pt is not interested in any vaccines at this time    Chronic antibiotic suppression  -     doxycycline (MONODOX) 100 MG capsule; Take 1 capsule (100 mg total) by mouth every 12 (twelve) hours.  Dispense: 60 capsule; Refill: 6  Continue Doxycycline 100 mg 1 po BID   Risk vs benefits discussed with patient along with a/e's. Pt is to remain upright for at lease 30 minutes to prevent pill esophagitis and will need sun protection d/t photosensitivity with medication (pt will need to avoid direct sun exposure, wear sunscreen, wear sunglasses, wear long sleeves/skin protection, etc).  Pt verbalized understanding and agrees with plan.     I spent a total of 20 minutes on the day of the visit.This includes face to face time and non-face to face time preparing to see the patient (eg, review of tests), obtaining and/or reviewing separately obtained history, documenting clinical information in the electronic or other health record, independently interpreting results and communicating results to the patient/family/caregiver, or care coordinator.

## 2024-11-19 ENCOUNTER — HOSPITAL ENCOUNTER (OUTPATIENT)
Dept: RADIOLOGY | Facility: HOSPITAL | Age: 69
Discharge: HOME | End: 2024-11-19
Attending: FAMILY MEDICINE
Payer: MEDICARE

## 2024-11-19 DIAGNOSIS — Z87.891 PERSONAL HISTORY OF TOBACCO USE, PRESENTING HAZARDS TO HEALTH: ICD-10-CM

## 2024-11-19 PROCEDURE — 71271 CT THORAX LUNG CANCER SCR C-: CPT | Mod: TC

## 2024-11-20 ENCOUNTER — TELEPHONE (OUTPATIENT)
Dept: INFECTIOUS DISEASES | Facility: CLINIC | Age: 69
End: 2024-11-20
Payer: MEDICARE

## 2024-11-20 NOTE — TELEPHONE ENCOUNTER
Patient had a low dose CT scan which recommends repeat imaging in 12 months. Incidental finding was noted. 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 Pt notified of findings. This will need to be worked up further by her PCP. Please call Dr. Elias's office with the result and send over the result for him to review. Pt will need further evaluation. I advised her to reach out to his office to schedule an appt ASAP.

## 2024-11-21 NOTE — TELEPHONE ENCOUNTER
Called Dr. Elias's office and gave results to Rola, also faxed this note and results, she stated she received it.

## 2025-02-03 ENCOUNTER — OFFICE VISIT (OUTPATIENT)
Dept: URGENT CARE | Facility: CLINIC | Age: 70
End: 2025-02-03
Payer: MEDICARE

## 2025-02-03 VITALS
TEMPERATURE: 99 F | OXYGEN SATURATION: 99 % | SYSTOLIC BLOOD PRESSURE: 169 MMHG | RESPIRATION RATE: 18 BRPM | DIASTOLIC BLOOD PRESSURE: 68 MMHG | HEIGHT: 65 IN | BODY MASS INDEX: 20.83 KG/M2 | WEIGHT: 125 LBS | HEART RATE: 96 BPM

## 2025-02-03 DIAGNOSIS — K11.20 SIALADENITIS: Primary | ICD-10-CM

## 2025-02-03 DIAGNOSIS — R22.0 RIGHT FACIAL SWELLING: ICD-10-CM

## 2025-02-03 DIAGNOSIS — K11.20 PAROTIDITIS: ICD-10-CM

## 2025-02-03 PROCEDURE — 99203 OFFICE O/P NEW LOW 30 MIN: CPT | Mod: ,,, | Performed by: NURSE PRACTITIONER

## 2025-02-03 RX ORDER — DICLOFENAC SODIUM 75 MG/1
75 TABLET, DELAYED RELEASE ORAL 2 TIMES DAILY
Qty: 20 TABLET | Refills: 0 | Status: SHIPPED | OUTPATIENT
Start: 2025-02-03 | End: 2025-02-13

## 2025-02-03 RX ORDER — CLINDAMYCIN HYDROCHLORIDE 300 MG/1
300 CAPSULE ORAL 3 TIMES DAILY
Qty: 30 CAPSULE | Refills: 0 | Status: SHIPPED | OUTPATIENT
Start: 2025-02-03 | End: 2025-02-13

## 2025-02-03 NOTE — PATIENT INSTRUCTIONS
Sialoadenitis is an inflammation or infection of one or more of your salivary glands. A small stone can block the salivary gland and cause inflammation. Infection may be caused by a virus or bacteria. You can develop sialoadenitis on one or both sides of your face.  DISCHARGE INSTRUCTIONS:  Return to the emergency department if:  You have trouble breathing or swallowing because of swelling.  Call your doctor if:  You have trouble opening your mouth because of swelling.  Your salivary gland gets more red and hot or drains more pus.  The pain and swelling do not go away within 2 days, or they get worse.  Your mouth is very dry.  You lose movement on one side of your face.  You have questions about your condition or care.  Medicines:  You may need one or more of the following:  Antibiotics may be given to treat a bacterial infection.  Acetaminophen decreases pain and fever. It is available without a doctor's order. Ask how much to give your child and how often to give it. Follow directions. Read the labels of all other medicines your child uses to see if they also contain acetaminophen, or ask your child's doctor or pharmacist. Acetaminophen can cause liver damage if not taken correctly.  NSAIDs , such as ibuprofen, help decrease swelling, pain, and fever. This medicine is available with or without a doctor's order. NSAIDs can cause stomach bleeding or kidney problems in certain people. If you take blood thinner medicine, always ask your healthcare provider if NSAIDs are safe for you. Always read the medicine label and follow directions.  Take your medicine as directed. Contact your healthcare provider if you think your medicine is not helping or if you have side effects. Tell your provider if you are allergic to any medicine. Keep a list of the medicines, vitamins, and herbs you take. Include the amounts, and when and why you take them. Bring the list or the pill bottles to follow-up visits. Carry your medicine list  with you in case of an emergency.  Manage or prevent sialoadenitis:  Drink liquids as directed. You may need to drink more liquids than usual. Ask how much liquid to drink each day and which liquids are best for you. Good choices of liquids for most people include water, tea, soup, juice, or milk.  Practice good oral care. Brush your teeth 2 times a day, 1 time in the morning and 1 time in the evening. Use a fluoride toothpaste. Floss your teeth 1 time each day, usually in the evening. Use mouthwash after you floss. Swish it around in your mouth for 30 seconds and spit it out.  Keep your mouth moist. Suck on hard candy or chew sugarless gum to get your saliva flowing. Sour and tart flavors such as lemon and orange will help get saliva to flow. This will help keep your mouth moist and help push out a stone blocking your salivary duct.  Apply a warm, wet cloth and massage the swollen area as directed. This may help relieve swelling and pain by pushing the pus out of the gland.  Follow up with your doctor or dentist as directed:  Write down your questions so you remember to ask them during your visits.

## 2025-02-03 NOTE — PROGRESS NOTES
"Subjective:      Patient ID: Maryellen Velez is a 69 y.o. female.    Vitals:  height is 5' 5" (1.651 m) and weight is 56.7 kg (125 lb). Her oral temperature is 98.6 °F (37 °C). Her blood pressure is 169/68 (abnormal) and her pulse is 96. Her respiration is 18 and oxygen saturation is 99%.     Chief Complaint: Facial Swelling     Patient is a 69 y.o. female who presents to urgent care with complaints of right side jaw swelling and pain since this morning. Alleviating factors include salt water gargles with no relief. Patient denies gum issues.       Constitution: Negative.   HENT:  Positive for facial swelling.    Neck: neck negative.   Cardiovascular: Negative.    Eyes: Negative.    Respiratory: Negative.     Gastrointestinal: Negative.    Endocrine: negative.   Genitourinary: Negative.    Musculoskeletal: Negative.    Skin: Negative.    Allergic/Immunologic: Negative.    Hematologic/Lymphatic: Negative.    Psychiatric/Behavioral: Negative.        Objective:     Physical Exam   Constitutional: She is oriented to person, place, and time. She appears well-developed. She is cooperative.   HENT:   Head: Normocephalic and atraumatic.       Ears:   Right Ear: Hearing, tympanic membrane, external ear and ear canal normal.   Left Ear: Hearing, tympanic membrane, external ear and ear canal normal.   Nose: Nose normal. No mucosal edema, rhinorrhea, nasal deformity or congestion. No epistaxis. Right sinus exhibits no maxillary sinus tenderness and no frontal sinus tenderness. Left sinus exhibits no maxillary sinus tenderness and no frontal sinus tenderness.   Mouth/Throat: Uvula is midline, oropharynx is clear and moist and mucous membranes are normal. No trismus in the jaw. Normal dentition. No uvula swelling. No oropharyngeal exudate or posterior oropharyngeal erythema.   Eyes: Conjunctivae and lids are normal.   Neck: Trachea normal and phonation normal. Neck supple.   Cardiovascular: Normal rate, regular rhythm, normal " heart sounds and normal pulses.   Pulmonary/Chest: Effort normal and breath sounds normal.   Abdominal: Normal appearance and bowel sounds are normal. Soft.   Musculoskeletal: Normal range of motion.         General: Normal range of motion.   Lymphadenopathy:     She has cervical adenopathy.        Right cervical: Superficial cervical adenopathy present.   Neurological: She is alert and oriented to person, place, and time. She exhibits normal muscle tone.   Skin: Skin is warm, dry and intact.   Psychiatric: Her speech is normal and behavior is normal. Judgment and thought content normal.   Nursing note and vitals reviewed.         Previous History      Review of patient's allergies indicates:   Allergen Reactions    Shrimp Anaphylaxis    Hydromorphone      Other reaction(s): nausea/vomiting, Other (See Comments)  Pt. States that it cause dizziness      Shellfish containing products      Other reaction(s): hives/can't breathe    Meperidine Nausea Only     Other reaction(s): Memory loss/dyspepsia    Oxycodone-acetaminophen Nausea Only     Other reaction(s): nausea/vomiting  Patient is not truely allergic to medication. Has requested Norcomakes patient nauseous         Past Medical History:   Diagnosis Date    COPD (chronic obstructive pulmonary disease)     Hyperlipidemia     Personal history of colonic polyps 2017    Outside Records     Current Outpatient Medications   Medication Instructions    acetaminophen (TYLENOL) 500 mg, Every 6 hours PRN    clindamycin (CLEOCIN) 300 mg, Oral, 3 times daily    diclofenac (VOLTAREN) 75 mg, Oral, 2 times daily    doxycycline (MONODOX) 100 mg, Oral, Every 12 hours    ibuprofen (ADVIL,MOTRIN) 800 mg, Every 6 hours PRN    SYMBICORT 160-4.5 mcg/actuation HFAA     tiotropium bromide (SPIRIVA RESPIMAT) 1.25 mcg/actuation inhaler Inhale into the lungs.     Past Surgical History:   Procedure Laterality Date     SECTION      COLONOSCOPY  2017    Outside Records     "ELBOW SURGERY Left     FRACTURE SURGERY  May 2016    NECK SURGERY      NOSE SURGERY      OPEN REDUCTION AND INTERNAL FIXATION (ORIF) OF FRACTURE OF DISTAL RADIUS Right 2024    Procedure: ORIF, FRACTURE, RADIUS, DISTAL, RIGHT;  Surgeon: Bunny Monroe MD;  Location: [facility];  Service: Orthopedics;  Laterality: Right;  Supine, arm table, tourniquet  [employer] DVR   2nds case, ok for block    SHOULDER SURGERY Left     TUBAL LIGATION      WRIST SURGERY Left      Family History   Problem Relation Name Age of Onset    No Known Problems Mother      Heart attack Father      Cancer Sister Edel Garcia              Hyperlipidemia Sister Edel Garcia     Hypertension Sister Edel Garcia     Hypertension Brother Roberto Carlos Simon     Cancer Brother Roberto Carlos Simon     COPD Brother Roberto Carlos Simon        Social History     Tobacco Use    Smoking status: Former     Current packs/day: 0.00     Average packs/day: 2.0 packs/day for 44.8 years (89.7 ttl pk-yrs)     Types: Cigarettes     Start date: 1970     Quit date: 2015     Years since quittin.8    Smokeless tobacco: Never   Substance Use Topics    Alcohol use: Never    Drug use: Never     Types: Marijuana        Physical Exam      Vital Signs Reviewed   BP (!) 169/68   Pulse 96   Temp 98.6 °F (37 °C) (Oral)   Resp 18   Ht 5' 5" (1.651 m)   Wt 56.7 kg (125 lb)   LMP  (LMP Unknown)   SpO2 99%   BMI 20.80 kg/m²        Procedures    Procedures     Labs     Results for orders placed or performed in visit on 24   Comprehensive Metabolic Panel    Collection Time: 24  9:47 AM   Result Value Ref Range    Sodium 142 136 - 145 mmol/L    Potassium 4.9 3.5 - 5.1 mmol/L    Chloride 110 (H) 98 - 107 mmol/L    CO2 25 23 - 31 mmol/L    Glucose 84 82 - 115 mg/dL    Blood Urea Nitrogen 13.2 9.8 - 20.1 mg/dL    Creatinine 0.81 0.55 - 1.02 mg/dL    Calcium 9.7 8.4 - 10.2 mg/dL    Protein Total 6.9 5.8 - 7.6 gm/dL    Albumin 3.8 3.4 - 4.8 g/dL    " Globulin 3.1 2.4 - 3.5 gm/dL    Albumin/Globulin Ratio 1.2 1.1 - 2.0 ratio    Bilirubin Total 0.5 <=1.5 mg/dL    ALP 90 40 - 150 unit/L    ALT 12 0 - 55 unit/L    AST 15 5 - 34 unit/L    eGFR >60 mL/min/1.73/m2    Anion Gap 7.0 mEq/L    BUN/Creatinine Ratio 16    C-Reactive Protein    Collection Time: 11/08/24  9:47 AM   Result Value Ref Range    CRP 3.00 <5.00 mg/L   Sedimentation rate    Collection Time: 11/08/24  9:47 AM   Result Value Ref Range    Sed Rate 19 0 - 20 mm/hr   CBC with Differential    Collection Time: 11/08/24  9:47 AM   Result Value Ref Range    WBC 7.03 4.50 - 11.50 x10(3)/mcL    RBC 4.59 4.20 - 5.40 x10(6)/mcL    Hgb 13.5 12.0 - 16.0 g/dL    Hct 40.9 37.0 - 47.0 %    MCV 89.1 80.0 - 94.0 fL    MCH 29.4 27.0 - 31.0 pg    MCHC 33.0 33.0 - 36.0 g/dL    RDW 13.2 11.5 - 17.0 %    Platelet 232 130 - 400 x10(3)/mcL    MPV 9.0 7.4 - 10.4 fL    Neut % 62.9 %    Lymph % 23.6 %    Mono % 9.4 %    Eos % 3.6 %    Basophil % 0.4 %    Lymph # 1.66 0.6 - 4.6 x10(3)/mcL    Neut # 4.42 2.1 - 9.2 x10(3)/mcL    Mono # 0.66 0.1 - 1.3 x10(3)/mcL    Eos # 0.25 0 - 0.9 x10(3)/mcL    Baso # 0.03 <=0.2 x10(3)/mcL    Imm Gran # 0.01 0 - 0.04 x10(3)/mcL    Imm Grans % 0.1 %     Assessment:     1. Sialadenitis    2. Right facial swelling    3. Parotiditis        Plan:     Take probiotics as directed  Take prescription medications as directed  Follow-up with ENT      Sialoadenitis is an inflammation or infection of one or more of your salivary glands. A small stone can block the salivary gland and cause inflammation. Infection may be caused by a virus or bacteria. You can develop sialoadenitis on one or both sides of your face.  DISCHARGE INSTRUCTIONS:  Return to the emergency department if:  You have trouble breathing or swallowing because of swelling.  Call your doctor if:  You have trouble opening your mouth because of swelling.  Your salivary gland gets more red and hot or drains more pus.  The pain and swelling do not  go away within 2 days, or they get worse.  Your mouth is very dry.  You lose movement on one side of your face.  You have questions about your condition or care.  Medicines:  You may need one or more of the following:  Antibiotics may be given to treat a bacterial infection.  Acetaminophen decreases pain and fever. It is available without a doctor's order. Ask how much to give your child and how often to give it. Follow directions. Read the labels of all other medicines your child uses to see if they also contain acetaminophen, or ask your child's doctor or pharmacist. Acetaminophen can cause liver damage if not taken correctly.  NSAIDs , such as ibuprofen, help decrease swelling, pain, and fever. This medicine is available with or without a doctor's order. NSAIDs can cause stomach bleeding or kidney problems in certain people. If you take blood thinner medicine, always ask your healthcare provider if NSAIDs are safe for you. Always read the medicine label and follow directions.  Take your medicine as directed. Contact your healthcare provider if you think your medicine is not helping or if you have side effects. Tell your provider if you are allergic to any medicine. Keep a list of the medicines, vitamins, and herbs you take. Include the amounts, and when and why you take them. Bring the list or the pill bottles to follow-up visits. Carry your medicine list with you in case of an emergency.  Manage or prevent sialoadenitis:  Drink liquids as directed. You may need to drink more liquids than usual. Ask how much liquid to drink each day and which liquids are best for you. Good choices of liquids for most people include water, tea, soup, juice, or milk.  Practice good oral care. Brush your teeth 2 times a day, 1 time in the morning and 1 time in the evening. Use a fluoride toothpaste. Floss your teeth 1 time each day, usually in the evening. Use mouthwash after you floss. Swish it around in your mouth for 30 seconds  and spit it out.  Keep your mouth moist. Suck on hard candy or chew sugarless gum to get your saliva flowing. Sour and tart flavors such as lemon and orange will help get saliva to flow. This will help keep your mouth moist and help push out a stone blocking your salivary duct.  Apply a warm, wet cloth and massage the swollen area as directed. This may help relieve swelling and pain by pushing the pus out of the gland.  Follow up with your doctor or dentist as directed:  Write down your questions so you remember to ask them during your visits.      Sialadenitis  -     clindamycin (CLEOCIN) 300 MG capsule; Take 1 capsule (300 mg total) by mouth 3 (three) times daily. for 10 days  Dispense: 30 capsule; Refill: 0  -     diclofenac (VOLTAREN) 75 MG EC tablet; Take 1 tablet (75 mg total) by mouth 2 (two) times daily. for 10 days  Dispense: 20 tablet; Refill: 0    Right facial swelling    Parotiditis

## 2025-05-09 ENCOUNTER — LAB VISIT (OUTPATIENT)
Dept: LAB | Facility: HOSPITAL | Age: 70
End: 2025-05-09
Attending: NURSE PRACTITIONER
Payer: MEDICARE

## 2025-05-09 DIAGNOSIS — M86.632: ICD-10-CM

## 2025-05-09 LAB
ALBUMIN SERPL-MCNC: 3.6 G/DL (ref 3.4–4.8)
ALBUMIN/GLOB SERPL: 1.1 RATIO (ref 1.1–2)
ALP SERPL-CCNC: 80 UNIT/L (ref 40–150)
ALT SERPL-CCNC: 14 UNIT/L (ref 0–55)
ANION GAP SERPL CALC-SCNC: 6 MEQ/L
AST SERPL-CCNC: 15 UNIT/L (ref 11–45)
BASOPHILS # BLD AUTO: 0.04 X10(3)/MCL
BASOPHILS NFR BLD AUTO: 0.7 %
BILIRUB SERPL-MCNC: 0.6 MG/DL
BUN SERPL-MCNC: 16.1 MG/DL (ref 9.8–20.1)
CALCIUM SERPL-MCNC: 8.9 MG/DL (ref 8.4–10.2)
CHLORIDE SERPL-SCNC: 107 MMOL/L (ref 98–107)
CO2 SERPL-SCNC: 23 MMOL/L (ref 23–31)
CREAT SERPL-MCNC: 0.69 MG/DL (ref 0.55–1.02)
CREAT/UREA NIT SERPL: 23
CRP SERPL-MCNC: 2.3 MG/L
EOSINOPHIL # BLD AUTO: 0.29 X10(3)/MCL (ref 0–0.9)
EOSINOPHIL NFR BLD AUTO: 5 %
ERYTHROCYTE [DISTWIDTH] IN BLOOD BY AUTOMATED COUNT: 13.1 % (ref 11.5–17)
ERYTHROCYTE [SEDIMENTATION RATE] IN BLOOD: 22 MM/HR (ref 0–20)
GFR SERPLBLD CREATININE-BSD FMLA CKD-EPI: >60 ML/MIN/1.73/M2
GLOBULIN SER-MCNC: 3.3 GM/DL (ref 2.4–3.5)
GLUCOSE SERPL-MCNC: 94 MG/DL (ref 82–115)
HCT VFR BLD AUTO: 38.5 % (ref 37–47)
HGB BLD-MCNC: 12.9 G/DL (ref 12–16)
IMM GRANULOCYTES # BLD AUTO: 0.01 X10(3)/MCL (ref 0–0.04)
IMM GRANULOCYTES NFR BLD AUTO: 0.2 %
LYMPHOCYTES # BLD AUTO: 1.51 X10(3)/MCL (ref 0.6–4.6)
LYMPHOCYTES NFR BLD AUTO: 26.1 %
MCH RBC QN AUTO: 29.1 PG (ref 27–31)
MCHC RBC AUTO-ENTMCNC: 33.5 G/DL (ref 33–36)
MCV RBC AUTO: 86.9 FL (ref 80–94)
MONOCYTES # BLD AUTO: 0.56 X10(3)/MCL (ref 0.1–1.3)
MONOCYTES NFR BLD AUTO: 9.7 %
NEUTROPHILS # BLD AUTO: 3.37 X10(3)/MCL (ref 2.1–9.2)
NEUTROPHILS NFR BLD AUTO: 58.3 %
PLATELET # BLD AUTO: 199 X10(3)/MCL (ref 130–400)
PMV BLD AUTO: 8.4 FL (ref 7.4–10.4)
POTASSIUM SERPL-SCNC: 4.1 MMOL/L (ref 3.5–5.1)
PROT SERPL-MCNC: 6.9 GM/DL (ref 5.8–7.6)
RBC # BLD AUTO: 4.43 X10(6)/MCL (ref 4.2–5.4)
SODIUM SERPL-SCNC: 136 MMOL/L (ref 136–145)
WBC # BLD AUTO: 5.78 X10(3)/MCL (ref 4.5–11.5)

## 2025-05-09 PROCEDURE — 36415 COLL VENOUS BLD VENIPUNCTURE: CPT

## 2025-05-09 PROCEDURE — 80053 COMPREHEN METABOLIC PANEL: CPT

## 2025-05-09 PROCEDURE — 86140 C-REACTIVE PROTEIN: CPT

## 2025-05-09 PROCEDURE — 85025 COMPLETE CBC W/AUTO DIFF WBC: CPT

## 2025-05-09 PROCEDURE — 85652 RBC SED RATE AUTOMATED: CPT

## 2025-05-15 ENCOUNTER — OFFICE VISIT (OUTPATIENT)
Dept: INFECTIOUS DISEASES | Facility: CLINIC | Age: 70
End: 2025-05-15

## 2025-05-15 VITALS
WEIGHT: 120.5 LBS | SYSTOLIC BLOOD PRESSURE: 117 MMHG | BODY MASS INDEX: 20.08 KG/M2 | HEART RATE: 87 BPM | DIASTOLIC BLOOD PRESSURE: 72 MMHG | TEMPERATURE: 98 F | RESPIRATION RATE: 16 BRPM | HEIGHT: 65 IN

## 2025-05-15 DIAGNOSIS — M86.632: Primary | ICD-10-CM

## 2025-05-15 DIAGNOSIS — Z79.2 CHRONIC ANTIBIOTIC SUPPRESSION: ICD-10-CM

## 2025-05-15 NOTE — PROGRESS NOTES
Patient ID: Maryellen Velez 69 y.o.     Chief Complaint:   Chief Complaint   Patient presents with    Follow-up     osteo        HPI:    Ms. Velez is a 68 y/o WF here for follow up of chronic left forearm osteomyelitis. Hx of past ulnar fx  with MRSA positive culture 2016 and bone bx culture + for MRSA 18. Pt completed 6 weeks of Daptomycin 6mg/kg IV daily 10/26/18.  She was then started on Doxycycline 100mg 1 po BID lifelong. Pt endorses she is tolerating the meds well. She denies fever, chills, headaches, visual problems, N/V/D, SOB, cough, abdominal pain, drainage/erythema/warmth from the healed surgical site. Reviewed labs from 25 CBC/CMP WNL, sed rate 22, and CRP 2.30. Pt is due for several vaccines, but is not interested in any at this time. PCP is Dr. Elias in [place]. Cervical pap was done by Dr. Elias. Colonoscopy  in [place] by Dr. Childs. Last mammogram and bone density scan 24.          Past Medical History:   Diagnosis Date    COPD (chronic obstructive pulmonary disease)     Hyperlipidemia     Personal history of colonic polyps 2017    Outside Records        Past Surgical History:   Procedure Laterality Date     SECTION      COLONOSCOPY  2017    Outside Records    ELBOW SURGERY Left     FRACTURE SURGERY  May 2016    NECK SURGERY      NOSE SURGERY      OPEN REDUCTION AND INTERNAL FIXATION (ORIF) OF FRACTURE OF DISTAL RADIUS Right 2024    Procedure: ORIF, FRACTURE, RADIUS, DISTAL, RIGHT;  Surgeon: Bunny Monroe MD;  Location: [facility];  Service: Orthopedics;  Laterality: Right;  Supine, arm table, tourniquet  [employer] DVR   2nds case, ok for block    SHOULDER SURGERY Left     TUBAL LIGATION      WRIST SURGERY Left         Social History     Socioeconomic History    Marital status:    Tobacco Use    Smoking status: Former     Current packs/day: 0.00     Average packs/day: 2.0 packs/day for 44.8 years (89.7 ttl pk-yrs)     " Types: Cigarettes     Start date: 1970     Quit date: 2015     Years since quitting: 10.1    Smokeless tobacco: Never   Substance and Sexual Activity    Alcohol use: Never    Drug use: Never     Types: Marijuana    Sexual activity: Not Currently     Partners: Male     Birth control/protection: None        Family History   Problem Relation Name Age of Onset    No Known Problems Mother      Heart attack Father      Cancer Sister Edel Garcia              Hyperlipidemia Sister Edel Garcia     Hypertension Sister Edel Garcia     Hypertension Brother Roberto Carlos Simon     Cancer Brother Roberto Carlos Simon     COPD Brother Roberto Carlos Simon         Review of patient's allergies indicates:   Allergen Reactions    Shrimp Anaphylaxis    Hydromorphone      Other reaction(s): nausea/vomiting, Other (See Comments)  Pt. States that it cause dizziness      Shellfish containing products      Other reaction(s): hives/can't breathe    Meperidine Nausea Only     Other reaction(s): Memory loss/dyspepsia    Oxycodone-acetaminophen Nausea Only     Other reaction(s): nausea/vomiting  Patient is not truely allergic to medication. Has requested Norcomakes patient nauseous          Immunization History   Administered Date(s) Administered    Hepatitis A / Hepatitis B 2022, 2022, 2023    Tdap 2016        Review of Systems   Constitutional: Negative.    HENT: Negative.     Eyes: Negative.    Respiratory: Negative.     Cardiovascular: Negative.    Gastrointestinal: Negative.    Genitourinary: Negative.    Musculoskeletal: Negative.    Skin: Negative.    Neurological: Negative.    Endo/Heme/Allergies: Negative.    Psychiatric/Behavioral: Negative.     All other systems reviewed and are negative.         Objective:      /72   Pulse 87   Temp 97.9 °F (36.6 °C) (Oral)   Resp 16   Ht 5' 5" (1.651 m)   Wt 54.6 kg (120 lb 7.7 oz)   LMP  (LMP Unknown)   BMI 20.05 kg/m²      Physical Exam  Vitals " reviewed.   Constitutional:       General: She is not in acute distress.     Appearance: Normal appearance.   HENT:      Head: Normocephalic.      Right Ear: External ear normal.      Left Ear: External ear normal.      Nose: Nose normal.      Mouth/Throat:      Mouth: Mucous membranes are moist.      Pharynx: Oropharynx is clear.   Eyes:      General: No scleral icterus.     Extraocular Movements: Extraocular movements intact.      Conjunctiva/sclera: Conjunctivae normal.      Pupils: Pupils are equal, round, and reactive to light.   Cardiovascular:      Rate and Rhythm: Normal rate and regular rhythm.      Pulses: Normal pulses.      Heart sounds: Normal heart sounds.   Pulmonary:      Effort: Pulmonary effort is normal. No respiratory distress.      Breath sounds: Normal breath sounds.   Abdominal:      General: Bowel sounds are normal. There is no distension.      Palpations: Abdomen is soft. There is no mass.      Tenderness: There is no abdominal tenderness. There is no right CVA tenderness or left CVA tenderness.      Hernia: No hernia is present.   Musculoskeletal:         General: No tenderness or signs of injury. Normal range of motion.      Cervical back: Normal range of motion and neck supple.      Right lower leg: No edema.      Left lower leg: No edema.   Lymphadenopathy:      Cervical: No cervical adenopathy.   Skin:     General: Skin is warm and dry.      Capillary Refill: Capillary refill takes less than 2 seconds.      Findings: No erythema or lesion.      Comments: Left forearm without erythema or drainage noted.  Healed scar noted from the elbow to the forearm.   Neurological:      General: No focal deficit present.      Mental Status: She is alert and oriented to person, place, and time. Mental status is at baseline.   Psychiatric:         Mood and Affect: Mood normal.         Behavior: Behavior normal.         Thought Content: Thought content normal.         Judgment: Judgment normal.           Labs:   Lab Results   Component Value Date    WBC 5.78 05/09/2025    HGB 12.9 05/09/2025    HCT 38.5 05/09/2025    MCV 86.9 05/09/2025     05/09/2025       CMP  Sodium   Date Value Ref Range Status   05/09/2025 136 136 - 145 mmol/L Final     Potassium   Date Value Ref Range Status   05/09/2025 4.1 3.5 - 5.1 mmol/L Final     Chloride   Date Value Ref Range Status   05/09/2025 107 98 - 107 mmol/L Final     CO2   Date Value Ref Range Status   05/09/2025 23 23 - 31 mmol/L Final     Glucose   Date Value Ref Range Status   05/09/2025 94 82 - 115 mg/dL Final     Blood Urea Nitrogen   Date Value Ref Range Status   05/09/2025 16.1 9.8 - 20.1 mg/dL Final     Creatinine   Date Value Ref Range Status   05/09/2025 0.69 0.55 - 1.02 mg/dL Final     Calcium   Date Value Ref Range Status   05/09/2025 8.9 8.4 - 10.2 mg/dL Final     Protein Total   Date Value Ref Range Status   05/09/2025 6.9 5.8 - 7.6 gm/dL Final     Albumin   Date Value Ref Range Status   05/09/2025 3.6 3.4 - 4.8 g/dL Final     Bilirubin Total   Date Value Ref Range Status   05/09/2025 0.6 <=1.5 mg/dL Final     ALP   Date Value Ref Range Status   05/09/2025 80 40 - 150 unit/L Final     AST   Date Value Ref Range Status   05/09/2025 15 11 - 45 unit/L Final     ALT   Date Value Ref Range Status   05/09/2025 14 0 - 55 unit/L Final     eGFR   Date Value Ref Range Status   05/09/2025 >60 mL/min/1.73/m2 Final     Comment:     Estimated GFR calculated using the CKD-EPI creatinine (2021) equation.     Lab Results   Component Value Date    TSH 1.058 08/26/2023     Hep C Ab Interp   Date Value Ref Range Status   07/21/2022 Nonreactive Nonreactive Final     Cholesterol Total   Date Value Ref Range Status   08/26/2023 203 (H) <=200 mg/dL Final     HDL Cholesterol   Date Value Ref Range Status   08/26/2023 54 35 - 60 mg/dL Final     Triglyceride   Date Value Ref Range Status   08/26/2023 77 37 - 140 mg/dL Final     Cholesterol/HDL Ratio   Date Value Ref Range Status    08/26/2023 4 0 - 5 Final     Very Low Density Lipoprotein   Date Value Ref Range Status   08/26/2023 15  Final     LDL Cholesterol   Date Value Ref Range Status   08/26/2023 134.00 50.00 - 140.00 mg/dL Final     No results found for this or any previous visit.  No results found for this or any previous visit.  No results found for this or any previous visit.  No results found for this or any previous visit.  Results for orders placed or performed in visit on 11/29/17   Urinalysis   Result Value Ref Range    Color, UA Yellow >Yellow    Appearance, UA Clear >Clear    Specific Gravity,UA 1.015 1.000 - 1.032    pH, UA 6.5 5.0 - 9.0    Glucose, UA Negative >Negative    Protein, UA Negative >Negative    Occult Blood UA Trace-intact     Ketones, UA Negative >Negative    Bilirubin (UA) Negative >Negative    Urobilinogen, UA 0.2     Leukocytes, UA Trace (A) >Negative    RBC, UA 0-2 >0 - 2 /HPF    Squam Epithel, UA Occasional (A) >None-Rare    Nitrites, UA Negative     WBC, UA 2-4 >0 - 2 /HPF    Bacteria, UA Occasional (A) >None Seen /HPF       Imaging: Reviewed most recent relevant imaging studies available, notable results highlighted in this note    Medications:     Current Outpatient Medications   Medication Instructions    acetaminophen (TYLENOL) 500 mg, Every 6 hours PRN    doxycycline (MONODOX) 100 mg, Oral, Every 12 hours    ibuprofen (ADVIL,MOTRIN) 800 mg, Every 6 hours PRN    SYMBICORT 160-4.5 mcg/actuation HFAA     tiotropium bromide (SPIRIVA RESPIMAT) 1.25 mcg/actuation inhaler Inhale into the lungs.       Assessment:       1. Chronic osteomyelitis of left forearm  -     CBC Auto Differential; Future; Expected date: 11/17/2025  -     Comprehensive Metabolic Panel; Future; Expected date: 11/17/2025  -     Sedimentation rate; Future; Expected date: 11/17/2025  -     C-Reactive Protein; Future; Expected date: 11/17/2025    2. Chronic antibiotic suppression           Plan:      Chronic osteomyelitis of left  forearm  -     CBC Auto Differential; Future; Expected date: 11/17/2025  -     Comprehensive Metabolic Panel; Future; Expected date: 11/17/2025  -     Sedimentation rate; Future; Expected date: 11/17/2025  -     C-Reactive Protein; Future; Expected date: 11/17/2025  Continue Doxycycline 100 mg 1 po BID for chronic suppression   Labs 1 week before next visit   RTC 6 months with Gaby for an in office visit   Vaccines recommended. Pt is not interested in any vaccines at this time    Chronic antibiotic suppression  Continue Doxycycline 100 mg 1 po BID   Risk vs benefits discussed with patient along with a/e's. Pt is to remain upright for at lease 30 minutes to prevent pill esophagitis and will need sun protection d/t photosensitivity with medication (pt will need to avoid direct sun exposure, wear sunscreen, wear sunglasses, wear long sleeves/skin protection, etc).  Pt verbalized understanding and agrees with plan.

## 2025-07-17 ENCOUNTER — LAB VISIT (OUTPATIENT)
Dept: LAB | Facility: HOSPITAL | Age: 70
End: 2025-07-17
Attending: FAMILY MEDICINE
Payer: MEDICARE

## 2025-07-17 DIAGNOSIS — K21.9 GASTROESOPHAGEAL REFLUX DISEASE, UNSPECIFIED WHETHER ESOPHAGITIS PRESENT: ICD-10-CM

## 2025-07-17 DIAGNOSIS — M25.569 ARTHRALGIA OF LOWER LEG, UNSPECIFIED LATERALITY: ICD-10-CM

## 2025-07-17 DIAGNOSIS — J44.9 VANISHING LUNG: Primary | ICD-10-CM

## 2025-07-17 DIAGNOSIS — Z12.5 SPECIAL SCREENING FOR MALIGNANT NEOPLASM OF PROSTATE: ICD-10-CM

## 2025-07-17 DIAGNOSIS — M85.80 SAPHO SYNDROME: ICD-10-CM

## 2025-07-17 DIAGNOSIS — M65.90 SAPHO SYNDROME: ICD-10-CM

## 2025-07-17 DIAGNOSIS — R53.1 ASTHENIA: ICD-10-CM

## 2025-07-17 DIAGNOSIS — G25.81 RESTLESS LEGS: ICD-10-CM

## 2025-07-17 DIAGNOSIS — L40.3 SAPHO SYNDROME: ICD-10-CM

## 2025-07-17 DIAGNOSIS — L70.9 SAPHO SYNDROME: ICD-10-CM

## 2025-07-17 DIAGNOSIS — M86.9 SAPHO SYNDROME: ICD-10-CM

## 2025-07-17 DIAGNOSIS — E78.5 HYPERLIPIDEMIA, UNSPECIFIED HYPERLIPIDEMIA TYPE: ICD-10-CM

## 2025-07-17 LAB
ALBUMIN SERPL-MCNC: 3.6 G/DL (ref 3.4–4.8)
ALBUMIN/GLOB SERPL: 1 RATIO (ref 1.1–2)
ALP SERPL-CCNC: 97 UNIT/L (ref 40–150)
ALT SERPL-CCNC: 14 UNIT/L (ref 0–55)
ANION GAP SERPL CALC-SCNC: 0 MEQ/L
AST SERPL-CCNC: 15 UNIT/L (ref 11–45)
BILIRUB SERPL-MCNC: 0.6 MG/DL
BILIRUB UR QL STRIP.AUTO: NEGATIVE
BUN SERPL-MCNC: 14.3 MG/DL (ref 9.8–20.1)
CALCIUM SERPL-MCNC: 9.4 MG/DL (ref 8.4–10.2)
CHLORIDE SERPL-SCNC: 108 MMOL/L (ref 98–107)
CHOLEST SERPL-MCNC: 210 MG/DL
CHOLEST/HDLC SERPL: 4 {RATIO} (ref 0–5)
CLARITY UR: CLEAR
CO2 SERPL-SCNC: 31 MMOL/L (ref 23–31)
COLOR UR AUTO: NORMAL
CREAT SERPL-MCNC: 0.66 MG/DL (ref 0.55–1.02)
CREAT/UREA NIT SERPL: 22
ERYTHROCYTE [DISTWIDTH] IN BLOOD BY AUTOMATED COUNT: 13.2 % (ref 11.5–17)
EST. AVERAGE GLUCOSE BLD GHB EST-MCNC: 111.2 MG/DL
GFR SERPLBLD CREATININE-BSD FMLA CKD-EPI: >60 ML/MIN/1.73/M2
GLOBULIN SER-MCNC: 3.5 GM/DL (ref 2.4–3.5)
GLUCOSE SERPL-MCNC: 90 MG/DL (ref 82–115)
GLUCOSE UR QL STRIP: NEGATIVE
HBA1C MFR BLD: 5.5 %
HCT VFR BLD AUTO: 39.6 % (ref 37–47)
HDLC SERPL-MCNC: 56 MG/DL (ref 35–60)
HGB BLD-MCNC: 13.1 G/DL (ref 12–16)
HGB UR QL STRIP: NEGATIVE
KETONES UR QL STRIP: NEGATIVE
LDLC SERPL CALC-MCNC: 134 MG/DL (ref 50–140)
LEUKOCYTE ESTERASE UR QL STRIP: NEGATIVE
MCH RBC QN AUTO: 29.4 PG (ref 27–31)
MCHC RBC AUTO-ENTMCNC: 33.1 G/DL (ref 33–36)
MCV RBC AUTO: 88.8 FL (ref 80–94)
NITRITE UR QL STRIP: NEGATIVE
PH UR STRIP: 6.5 [PH]
PLATELET # BLD AUTO: 235 X10(3)/MCL (ref 130–400)
PMV BLD AUTO: 8.7 FL (ref 7.4–10.4)
POTASSIUM SERPL-SCNC: 3.8 MMOL/L (ref 3.5–5.1)
PROT SERPL-MCNC: 7.1 GM/DL (ref 5.8–7.6)
PROT UR QL STRIP: NEGATIVE
RBC # BLD AUTO: 4.46 X10(6)/MCL (ref 4.2–5.4)
SODIUM SERPL-SCNC: 139 MMOL/L (ref 136–145)
SP GR UR STRIP.AUTO: <=1.005 (ref 1–1.03)
TRIGL SERPL-MCNC: 100 MG/DL (ref 37–140)
TSH SERPL-ACNC: 1.4 UIU/ML (ref 0.35–4.94)
UROBILINOGEN UR STRIP-ACNC: 0.2
VLDLC SERPL CALC-MCNC: 20 MG/DL
WBC # BLD AUTO: 7.04 X10(3)/MCL (ref 4.5–11.5)

## 2025-07-17 PROCEDURE — 80061 LIPID PANEL: CPT

## 2025-07-17 PROCEDURE — 36415 COLL VENOUS BLD VENIPUNCTURE: CPT

## 2025-07-17 PROCEDURE — 81003 URINALYSIS AUTO W/O SCOPE: CPT

## 2025-07-17 PROCEDURE — 84443 ASSAY THYROID STIM HORMONE: CPT

## 2025-07-17 PROCEDURE — 83036 HEMOGLOBIN GLYCOSYLATED A1C: CPT

## 2025-07-17 PROCEDURE — 80053 COMPREHEN METABOLIC PANEL: CPT

## 2025-07-17 PROCEDURE — 85027 COMPLETE CBC AUTOMATED: CPT

## (undated) DEVICE — PADDING 4X4YD SPECIALIST100

## (undated) DEVICE — GOWN SMARTSLEEVE XXL/XLONG

## (undated) DEVICE — SEE MEDLINE ITEM 146270

## (undated) DEVICE — PADDING WYTEX UNDRCST 2INX4YD

## (undated) DEVICE — GLOVE 7.0 PROTEXIS PI MICRO

## (undated) DEVICE — GLOVE 8 PROTEXIS PI BLUE

## (undated) DEVICE — GAUZE SPONGE 4X4 12PLY

## (undated) DEVICE — BIT DRILL DVR 2.2MM

## (undated) DEVICE — APPLICATOR CHLORAPREP ORN 26ML

## (undated) DEVICE — Device

## (undated) DEVICE — BANDAGE ACE NON LATEX 3IN

## (undated) DEVICE — SPONGE LAP 18X18 PREWASHED

## (undated) DEVICE — DRESSING XEROFORM 5X9IN

## (undated) DEVICE — DRESSING GAUZE XEROFORM 5X9

## (undated) DEVICE — DRAPE STERI U-SHAPED 47X51IN

## (undated) DEVICE — DRAPE HAND STERILE

## (undated) DEVICE — BANDAGE ESMARK 4INX3YD

## (undated) DEVICE — WIRE KIRSCHNER 1.6MM 6IN SS
Type: IMPLANTABLE DEVICE | Site: RADIUS | Status: NON-FUNCTIONAL
Removed: 2024-01-26

## (undated) DEVICE — GLOVE 7.0 PROTEXIS PI BLUE

## (undated) DEVICE — GLOVE 8 PROTEXIS PI ORTHO

## (undated) DEVICE — SPONGE GAUZE 4X4 12 PLY STRL

## (undated) DEVICE — PAD CAST 6X4YD SPECIALISTIC